# Patient Record
Sex: MALE | Race: WHITE | NOT HISPANIC OR LATINO | Employment: OTHER | URBAN - METROPOLITAN AREA
[De-identification: names, ages, dates, MRNs, and addresses within clinical notes are randomized per-mention and may not be internally consistent; named-entity substitution may affect disease eponyms.]

---

## 2017-01-18 ENCOUNTER — HOSPITAL ENCOUNTER (OUTPATIENT)
Dept: CARDIOLOGY | Facility: HOSPITAL | Age: 72
Setting detail: RECURRING SERIES
Discharge: HOME | End: 2017-01-18

## 2017-01-18 PROCEDURE — 36416 COLLJ CAPILLARY BLOOD SPEC: CPT | Performed by: INTERNAL MEDICINE

## 2017-01-18 PROCEDURE — 85610 PROTHROMBIN TIME: CPT | Performed by: INTERNAL MEDICINE

## 2017-02-24 ENCOUNTER — HOSPITAL ENCOUNTER (OUTPATIENT)
Dept: CARDIOLOGY | Facility: HOSPITAL | Age: 72
Setting detail: RECURRING SERIES
Discharge: HOME | End: 2017-02-24

## 2017-02-24 PROCEDURE — 85610 PROTHROMBIN TIME: CPT

## 2017-02-24 PROCEDURE — 36416 COLLJ CAPILLARY BLOOD SPEC: CPT

## 2017-03-28 ENCOUNTER — HOSPITAL ENCOUNTER (OUTPATIENT)
Dept: CARDIOLOGY | Facility: HOSPITAL | Age: 72
Setting detail: RECURRING SERIES
Discharge: HOME | End: 2017-03-28

## 2017-03-28 PROCEDURE — 85610 PROTHROMBIN TIME: CPT

## 2017-03-28 PROCEDURE — 36416 COLLJ CAPILLARY BLOOD SPEC: CPT

## 2017-04-01 DIAGNOSIS — I48.91 ATRIAL FIBRILLATION (HCC): ICD-10-CM

## 2017-04-03 RX ORDER — WARFARIN SODIUM 5 MG/1
TABLET ORAL
Qty: 90 TABLET | Refills: 2 | Status: SHIPPED | OUTPATIENT
Start: 2017-04-03 | End: 2017-04-06 | Stop reason: SDUPTHER

## 2017-04-05 ENCOUNTER — HOSPITAL ENCOUNTER (OUTPATIENT)
Dept: CARDIOLOGY | Facility: HOSPITAL | Age: 72
Setting detail: RECURRING SERIES
Discharge: HOME | End: 2017-04-05

## 2017-04-05 PROCEDURE — 36416 COLLJ CAPILLARY BLOOD SPEC: CPT

## 2017-04-05 PROCEDURE — 85610 PROTHROMBIN TIME: CPT

## 2017-04-06 RX ORDER — WARFARIN SODIUM 5 MG/1
5 TABLET ORAL
Qty: 60 TABLET | Refills: 0 | Status: SHIPPED | OUTPATIENT
Start: 2017-04-06 | End: 2018-01-22 | Stop reason: SDUPTHER

## 2017-04-19 ENCOUNTER — HOSPITAL ENCOUNTER (OUTPATIENT)
Dept: CARDIOLOGY | Facility: HOSPITAL | Age: 72
Setting detail: RECURRING SERIES
Discharge: HOME | End: 2017-04-19

## 2017-04-19 PROCEDURE — 36416 COLLJ CAPILLARY BLOOD SPEC: CPT

## 2017-04-19 PROCEDURE — 85610 PROTHROMBIN TIME: CPT

## 2017-05-19 ENCOUNTER — HOSPITAL ENCOUNTER (OUTPATIENT)
Dept: CARDIOLOGY | Facility: HOSPITAL | Age: 72
Setting detail: RECURRING SERIES
Discharge: HOME | End: 2017-05-19

## 2017-05-19 PROCEDURE — 36416 COLLJ CAPILLARY BLOOD SPEC: CPT

## 2017-05-19 PROCEDURE — 85610 PROTHROMBIN TIME: CPT

## 2017-06-14 ENCOUNTER — HOSPITAL ENCOUNTER (OUTPATIENT)
Dept: CARDIOLOGY | Facility: HOSPITAL | Age: 72
Setting detail: RECURRING SERIES
Discharge: HOME | End: 2017-06-14

## 2017-06-14 PROCEDURE — 85610 PROTHROMBIN TIME: CPT

## 2017-06-14 PROCEDURE — 36416 COLLJ CAPILLARY BLOOD SPEC: CPT

## 2017-07-21 ENCOUNTER — HOSPITAL ENCOUNTER (OUTPATIENT)
Dept: CARDIOLOGY | Facility: HOSPITAL | Age: 72
Setting detail: RECURRING SERIES
Discharge: HOME | End: 2017-07-21

## 2017-07-21 PROCEDURE — 85610 PROTHROMBIN TIME: CPT

## 2017-07-21 PROCEDURE — 36416 COLLJ CAPILLARY BLOOD SPEC: CPT

## 2017-08-23 ENCOUNTER — HOSPITAL ENCOUNTER (OUTPATIENT)
Dept: CARDIOLOGY | Facility: HOSPITAL | Age: 72
Setting detail: RECURRING SERIES
Discharge: HOME | End: 2017-08-23

## 2017-08-23 PROCEDURE — 85610 PROTHROMBIN TIME: CPT

## 2017-08-23 PROCEDURE — 36416 COLLJ CAPILLARY BLOOD SPEC: CPT

## 2017-08-28 RX ORDER — METOPROLOL SUCCINATE 25 MG/1
TABLET, EXTENDED RELEASE ORAL
Qty: 30 TABLET | Refills: 15 | Status: SHIPPED | OUTPATIENT
Start: 2017-08-28 | End: 2018-02-23 | Stop reason: SDUPTHER

## 2017-09-14 ENCOUNTER — HOSPITAL ENCOUNTER (OUTPATIENT)
Dept: CARDIOLOGY | Facility: HOSPITAL | Age: 72
Setting detail: RECURRING SERIES
Discharge: HOME | End: 2017-09-14

## 2017-09-14 PROCEDURE — 85610 PROTHROMBIN TIME: CPT

## 2017-09-14 PROCEDURE — 36416 COLLJ CAPILLARY BLOOD SPEC: CPT

## 2017-10-16 ENCOUNTER — HOSPITAL ENCOUNTER (OUTPATIENT)
Dept: CARDIOLOGY | Facility: HOSPITAL | Age: 72
Setting detail: RECURRING SERIES
Discharge: HOME | End: 2017-10-16

## 2017-10-16 PROCEDURE — 85610 PROTHROMBIN TIME: CPT

## 2017-10-16 PROCEDURE — 36416 COLLJ CAPILLARY BLOOD SPEC: CPT

## 2017-11-27 ENCOUNTER — HOSPITAL ENCOUNTER (OUTPATIENT)
Dept: CARDIOLOGY | Facility: HOSPITAL | Age: 72
Setting detail: RECURRING SERIES
Discharge: HOME | End: 2017-11-27

## 2017-11-27 PROCEDURE — 36416 COLLJ CAPILLARY BLOOD SPEC: CPT

## 2017-11-27 PROCEDURE — 85610 PROTHROMBIN TIME: CPT

## 2018-01-09 ENCOUNTER — TELEPHONE (OUTPATIENT)
Dept: CARDIOLOGY | Facility: HOSPITAL | Age: 73
End: 2018-01-09

## 2018-01-22 ENCOUNTER — HOSPITAL ENCOUNTER (OUTPATIENT)
Dept: CARDIOLOGY | Facility: HOSPITAL | Age: 73
Setting detail: RECURRING SERIES
Discharge: HOME | End: 2018-01-22

## 2018-01-22 PROCEDURE — 36416 COLLJ CAPILLARY BLOOD SPEC: CPT

## 2018-01-22 PROCEDURE — 85610 PROTHROMBIN TIME: CPT

## 2018-01-22 RX ORDER — WARFARIN SODIUM 5 MG/1
5 TABLET ORAL
Qty: 60 TABLET | Refills: 0 | Status: SHIPPED | OUTPATIENT
Start: 2018-01-22 | End: 2018-04-06 | Stop reason: SDUPTHER

## 2018-02-21 ENCOUNTER — HOSPITAL ENCOUNTER (OUTPATIENT)
Dept: CARDIOLOGY | Facility: HOSPITAL | Age: 73
Setting detail: RECURRING SERIES
Discharge: HOME | End: 2018-02-21

## 2018-02-21 PROCEDURE — 85610 PROTHROMBIN TIME: CPT

## 2018-02-21 PROCEDURE — 36416 COLLJ CAPILLARY BLOOD SPEC: CPT

## 2018-02-23 ENCOUNTER — OFFICE VISIT (OUTPATIENT)
Dept: CARDIOLOGY | Facility: CLINIC | Age: 73
End: 2018-02-23

## 2018-02-23 VITALS
WEIGHT: 224 LBS | BODY MASS INDEX: 32.07 KG/M2 | HEIGHT: 70 IN | SYSTOLIC BLOOD PRESSURE: 160 MMHG | HEART RATE: 46 BPM | DIASTOLIC BLOOD PRESSURE: 82 MMHG

## 2018-02-23 DIAGNOSIS — G47.33 OSA ON CPAP: ICD-10-CM

## 2018-02-23 DIAGNOSIS — I48.20 CHRONIC ATRIAL FIBRILLATION (HCC): Primary | ICD-10-CM

## 2018-02-23 DIAGNOSIS — Z99.89 OSA ON CPAP: ICD-10-CM

## 2018-02-23 DIAGNOSIS — E66.09 NON MORBID OBESITY DUE TO EXCESS CALORIES: ICD-10-CM

## 2018-02-23 DIAGNOSIS — I10 ESSENTIAL HYPERTENSION: ICD-10-CM

## 2018-02-23 PROCEDURE — 93000 ELECTROCARDIOGRAM COMPLETE: CPT | Performed by: NURSE PRACTITIONER

## 2018-02-23 PROCEDURE — 99213 OFFICE O/P EST LOW 20 MIN: CPT | Performed by: NURSE PRACTITIONER

## 2018-02-23 RX ORDER — AMLODIPINE BESYLATE 5 MG/1
TABLET ORAL
COMMUNITY
Start: 2017-11-24 | End: 2018-03-02 | Stop reason: SDUPTHER

## 2018-02-23 RX ORDER — METOPROLOL SUCCINATE 25 MG/1
25 TABLET, EXTENDED RELEASE ORAL DAILY
COMMUNITY
Start: 2015-04-22 | End: 2020-11-18

## 2018-02-23 RX ORDER — OMEPRAZOLE 20 MG/1
20 CAPSULE, DELAYED RELEASE ORAL DAILY
COMMUNITY

## 2018-02-23 NOTE — PROGRESS NOTES
Date of Office Visit: 2018  Encounter Provider: TJ Carranza  Place of Service: [facility] CARDIOLOGY  Patient Name: Diego Martinez  :1945    Chief Complaint   Patient presents with   • Atrial Fibrillation   :     HPI: Diego Martinez is a 72 y.o. male who is a patient of Dr. Conner's with a history of permanent AF, HTN, PITO and obesity. He was last seen by Dr. Conner in 2016. He presents today for routine follow up.    He reports that he feels good. He has had a good year. He denies chest pain, shortness of breath, dizziness or syncope. He does have some mild intermittent lower extremity swelling.           Past Medical History:   Diagnosis Date   • Atrial fibrillation    • Colon cancer        History reviewed. No pertinent surgical history.    Social History     Social History   • Marital status: Single     Spouse name: N/A   • Number of children: N/A   • Years of education: N/A     Occupational History   • Not on file.     Social History Main Topics   • Smoking status: Former Smoker   • Smokeless tobacco: Not on file   • Alcohol use No   • Drug use: No   • Sexual activity: Not on file     Other Topics Concern   • Not on file     Social History Narrative       History reviewed. No pertinent family history.    Review of Systems   Constitution: Negative for chills, fever, malaise/fatigue, weight gain and weight loss.   HENT: Negative for ear pain, hearing loss, nosebleeds and sore throat.    Eyes: Negative for double vision, pain and visual disturbance.   Cardiovascular: Negative for chest pain, dyspnea on exertion, irregular heartbeat, leg swelling, near-syncope, orthopnea, palpitations, paroxysmal nocturnal dyspnea and syncope.   Respiratory: Negative for cough, shortness of breath, sleep disturbances due to breathing, snoring and wheezing.    Endocrine: Negative for cold intolerance, heat intolerance and polyuria.   Hematologic/Lymphatic:  "Negative.    Skin: Negative for itching and rash.   Musculoskeletal: Negative for joint pain, joint swelling and myalgias.   Gastrointestinal: Negative for abdominal pain, diarrhea, melena, nausea and vomiting.   Genitourinary: Negative for frequency, hematuria and hesitancy.   Neurological: Negative for excessive daytime sleepiness, headaches, light-headedness, numbness, paresthesias and seizures.   Psychiatric/Behavioral: Negative for altered mental status and depression.   Allergic/Immunologic: Negative.    All other systems reviewed and are negative.      No Known Allergies      Current Outpatient Prescriptions:   •  amLODIPine (NORVASC) 5 MG tablet, TAKE 1 TABLET BY MOUTH ONE TIME A DAY , Disp: , Rfl:   •  metoprolol succinate XL (TOPROL-XL) 25 MG 24 hr tablet, Take 25 mg by mouth Daily., Disp: , Rfl:   •  omeprazole (priLOSEC) 20 MG capsule, Take 20 mg by mouth Daily., Disp: , Rfl:   •  warfarin (JANTOVEN) 5 MG tablet, Take 1 tablet by mouth Daily., Disp: 60 tablet, Rfl: 0     Objective:     Vitals:    02/23/18 0924   BP: 160/82   Pulse: (!) 46   Weight: 102 kg (224 lb)   Height: 177.8 cm (70\")     Body mass index is 32.14 kg/(m^2).    PHYSICAL EXAM:    Vitals Reviewed.   General Appearance: No acute distress, well developed and well nourished.   Eyes: Conjunctiva and lids: No erythema, swelling, or discharge. Sclera non-icteric.   HENT: Atraumatic, normocephalic. External eyes, ears, and nose normal.   Respiratory: No signs of respiratory distress. Respiration rhythm and depth normal.   Clear to auscultation. No rales, crackles, rhonchi, or wheezing auscultated.   Cardiovascular:  Jugular Venous Pressure: Normal  Heart Rate and Rhythm: Irregulaly, irregular.  Heart Sounds: Normal S1 and S2. No S3 or S4 noted.  Murmurs: No murmurs noted. No rubs, thrills, or gallops.   Arterial Pulses:  Posterior tibialis and dorsalis pedis pulses normal.   Lower Extremities: Trace to 1+ bilateral pre-tibial edema. "   Gastrointestinal:  Abdomen soft, non-distended, non-tender.   Musculoskeletal: Normal movement of extremities  Skin and Nails: General appearance normal. No pallor, cyanosis, diaphoresis. Skin temperature normal. No clubbing of fingernails.   Psychiatric: Patient alert and oriented to person, place, and time. Speech and behavior appropriate. Normal mood and affect.       ECG 12 Lead  Date/Time: 2/23/2018 9:43 AM  Performed by: DESMOND NASSAR  Authorized by: DESMOND NASSAR   Comparison: compared with previous ECG from 7/22/2016  Similar to previous ECG  Rhythm: atrial fibrillation  BPM: 49  Clinical impression: abnormal ECG              Assessment:       Diagnosis Plan   1. Chronic atrial fibrillation     2. Non morbid obesity due to excess calories     3. PITO on CPAP     4. Essential hypertension            Plan:       1. Atrial Fibrillation and Atrial Flutter  Assessment  • The patient has permanent atrial fibrillation  • The patient's CHADS2-VASc score is 2  • A LBU2SA0-RNUy score of 2 or more is considered a high risk for a thromboembolic event  • Warfarin prescribed  • Permanent AF, asymptomatic. He feels good. HR 40's, but feels good. No fatigue or dizziness.     2. For the problem of overweight/obesity, we discussed the importance of lifestyle measures and strategies for weight loss, such as improved nutrition, regular exercise and sleep hygiene.      3. PITO-he was using CPAP but says he has not been using it as much recently. Encourage to resume use of CPAP.    4. HTN, elevated in office today but he says it has been fine at home. Defer to PCP.    Return to see Dr. Conner in 18 months.     As always, it has been a pleasure to participate in your patient's care.      Sincerely,         TJ Anderson

## 2018-03-02 ENCOUNTER — TELEPHONE (OUTPATIENT)
Dept: CARDIOLOGY | Facility: CLINIC | Age: 73
End: 2018-03-02

## 2018-03-02 RX ORDER — AMLODIPINE BESYLATE 5 MG/1
5 TABLET ORAL DAILY
Qty: 30 TABLET | Refills: 5 | Status: SHIPPED | OUTPATIENT
Start: 2018-03-02 | End: 2018-08-27 | Stop reason: SDUPTHER

## 2018-03-02 NOTE — TELEPHONE ENCOUNTER
Rosemarie    Mr Mike calls because he seen his PCP and they will no longer fill his Rx for his amlodipine and wants to know if we can do this for him.    He can be reached at [phone].      Thank you  Paloma PUGH

## 2018-03-02 NOTE — TELEPHONE ENCOUNTER
Divina,    Will you call him and find out why his PCP would not fill his amlodipine? It is fine to refill but this is something usually managed by PCP's.    Rosemarie

## 2018-03-26 ENCOUNTER — HOSPITAL ENCOUNTER (OUTPATIENT)
Dept: CARDIOLOGY | Facility: HOSPITAL | Age: 73
Setting detail: RECURRING SERIES
Discharge: HOME | End: 2018-03-26

## 2018-03-26 PROCEDURE — 85610 PROTHROMBIN TIME: CPT

## 2018-03-26 PROCEDURE — 36416 COLLJ CAPILLARY BLOOD SPEC: CPT

## 2018-04-06 RX ORDER — WARFARIN SODIUM 5 MG/1
TABLET ORAL
Qty: 60 TABLET | Refills: 0 | Status: SHIPPED | OUTPATIENT
Start: 2018-04-06 | End: 2018-06-08 | Stop reason: SDUPTHER

## 2018-04-30 ENCOUNTER — HOSPITAL ENCOUNTER (OUTPATIENT)
Dept: CARDIOLOGY | Facility: HOSPITAL | Age: 73
Setting detail: RECURRING SERIES
Discharge: HOME | End: 2018-04-30

## 2018-04-30 PROCEDURE — 36416 COLLJ CAPILLARY BLOOD SPEC: CPT

## 2018-04-30 PROCEDURE — 85610 PROTHROMBIN TIME: CPT

## 2018-06-08 RX ORDER — WARFARIN SODIUM 5 MG/1
TABLET ORAL
Qty: 60 TABLET | Refills: 2 | Status: SHIPPED | OUTPATIENT
Start: 2018-06-08 | End: 2018-10-30 | Stop reason: SDUPTHER

## 2018-06-20 ENCOUNTER — HOSPITAL ENCOUNTER (OUTPATIENT)
Dept: CARDIOLOGY | Facility: HOSPITAL | Age: 73
Setting detail: RECURRING SERIES
Discharge: HOME | End: 2018-06-20

## 2018-06-20 PROCEDURE — 85610 PROTHROMBIN TIME: CPT

## 2018-06-20 PROCEDURE — 36416 COLLJ CAPILLARY BLOOD SPEC: CPT

## 2018-07-30 ENCOUNTER — HOSPITAL ENCOUNTER (OUTPATIENT)
Dept: CARDIOLOGY | Facility: HOSPITAL | Age: 73
Setting detail: RECURRING SERIES
Discharge: HOME | End: 2018-07-30

## 2018-07-30 PROCEDURE — 36416 COLLJ CAPILLARY BLOOD SPEC: CPT

## 2018-07-30 PROCEDURE — 85610 PROTHROMBIN TIME: CPT

## 2018-08-13 ENCOUNTER — ANTICOAGULATION VISIT (OUTPATIENT)
Dept: PHARMACY | Facility: HOSPITAL | Age: 73
End: 2018-08-13

## 2018-08-13 DIAGNOSIS — I48.20 CHRONIC ATRIAL FIBRILLATION (HCC): ICD-10-CM

## 2018-08-13 LAB
INR PPP: 3 (ref 0.91–1.09)
PROTHROMBIN TIME: 35.5 SECONDS (ref 10–13.8)

## 2018-08-13 PROCEDURE — G0463 HOSPITAL OUTPT CLINIC VISIT: HCPCS | Performed by: PHARMACIST

## 2018-08-13 PROCEDURE — 85610 PROTHROMBIN TIME: CPT

## 2018-08-13 PROCEDURE — 36416 COLLJ CAPILLARY BLOOD SPEC: CPT

## 2018-08-13 NOTE — PROGRESS NOTES
Anticoagulation Clinic Progress Note  Anticoagulation Summary  As of 2018    INR goal:   2.0-3.0   TTR:   --   Today's INR:   3.0   Warfarin maintenance plan:   7.5 mg on Fri; 5 mg all other days   Weekly warfarin total:   37.5 mg   Plan last modified:   Thelma Gonzalez RPH (2018)   Next INR check:   2018   Target end date:   Indefinite    Indications    Chronic atrial fibrillation (CMS/HCC) [I48.2]             Anticoagulation Episode Summary     INR check location:       Preferred lab:       [facility] [place]    Comments:         Anticoagulation Care Providers     Provider Role Specialty Phone number    Chuck Conner MD Referring Cardiology [phone]    Thelma Gonzalez RPH Responsible Pharmacy         Drug interactions: No changes in medications  Diet: Consistent    Clinic Interview:  Patient Findings     Negatives:   Signs/symptoms of thrombosis, Signs/symptoms of bleeding,   Laboratory test error suspected, Change in health, Change in alcohol use,   Change in activity, Upcoming invasive procedure, Emergency department   visit, Upcoming dental procedure, Missed doses, Extra doses, Change in   medications, Change in diet/appetite, Hospital admission, Bruising, Other   complaints      Clinical Outcomes     Negatives:   Major bleeding event, Thromboembolic event,   Anticoagulation-related hospital admission, Anticoagulation-related ED   visit, Anticoagulation-related fatality        Education:  Diego Martinez is a new start in the [facility]. We discussed the followin) Warfarin's indication, mechanism, and dosing  2) Enforced the importance of taking warfarin as instructed and at the same time every day, preferably in the evening so that we can make dose adjustments more easily following subsequent clinic visits  3) What he should do about a missed dose; pts can take missed doses within about 12 hours of their usual  scheduled dose, but he was instructed on the importance of not doubling up on doses unless told to do so by the [facility]  4) Explained possible side effects of warfarin therapy, including increased risk of bleeding, s/sx of bleeding and s/sx of any additional clots/PE/CVA.   5) Discussed monitoring of warfarin, the INR, goal INR range, and the frequency of monitoring  6) Reviewed drug/food/tobacco/EtOH interactions and provided written information covering these topics in more detail, explaining that green, leafy vegetables interact most heavily with warfarin  7) Instructed the pt not to take or discontinue any medications without informing his physician/pharmacist and reminded him to inform us of any dietary changes, as well  8) Explained that he would be coming into the clinic more frequently in these first few weeks of therapy as we try to adjust his dose and achieve a therapeutic INR x 2 consecutive readings. Once that is achieved, patient will follow up in clinic every 4 weeks, on average.    He stated no problems with transportation or scheduling clinic appts in this manner. he expressed understanding of the information provided and has no additional questions at this time.    Diego Martinez was presented with a copy of the Patients Rights and Responsibilities. he expressed verbal consent and agreement to receive care in the [facility] under the current collaborative care agreement with [facility].       INR History:  Previous INR data from [facility] is recorded in [facility] [employer] and scanned into the patient's chart in [email or web address]. These results have been analyzed and reviewed.    Plan:  1. INR is Therapeutic today- see above in Anticoagulation Summary.   Will instruct Diego Martinez to Continue their warfarin regimen- see above in Anticoagulation Summary.  2. Follow up in 2 weeks  3. Patient declines warfarin refills.  4. Verbal and  written information provided. Patient expresses understanding and has no further questions at this time.    Thelma Gonzalez, [facility]

## 2018-08-27 ENCOUNTER — ANTICOAGULATION VISIT (OUTPATIENT)
Dept: PHARMACY | Facility: HOSPITAL | Age: 73
End: 2018-08-27

## 2018-08-27 DIAGNOSIS — I48.20 CHRONIC ATRIAL FIBRILLATION (HCC): ICD-10-CM

## 2018-08-27 LAB
INR PPP: 3 (ref 0.91–1.09)
PROTHROMBIN TIME: 35.7 SECONDS (ref 10–13.8)

## 2018-08-27 PROCEDURE — 85610 PROTHROMBIN TIME: CPT

## 2018-08-27 PROCEDURE — G0463 HOSPITAL OUTPT CLINIC VISIT: HCPCS

## 2018-08-27 PROCEDURE — 36416 COLLJ CAPILLARY BLOOD SPEC: CPT

## 2018-08-27 NOTE — PROGRESS NOTES
Anticoagulation Clinic Progress Note    Anticoagulation Summary  As of 8/27/2018    INR goal:   2.0-3.0   TTR:   100.0 % (4 d)   Today's INR:   3.0   Warfarin maintenance plan:   7.5 mg on Fri; 5 mg all other days   Weekly warfarin total:   37.5 mg   No change documented:   France Persaud RPH   Plan last modified:   Thelma Gonzalez RPH (8/13/2018)   Next INR check:   9/24/2018   Target end date:   Indefinite    Indications    Chronic atrial fibrillation (CMS/HCC) [I48.2]             Anticoagulation Episode Summary     INR check location:       Preferred lab:       Send INR reminders to:    [facility] [place]    Comments:         Anticoagulation Care Providers     Provider Role Specialty Phone number    Chuck Conner MD Referring Cardiology [phone]    Thelma Gonzalez RPH Responsible Pharmacy           Drug interactions: has remained unchanged.  Diet: has remained unchanged.    Clinic Interview:  Patient Findings     Negatives:   Signs/symptoms of thrombosis, Signs/symptoms of bleeding,   Laboratory test error suspected, Change in health, Change in alcohol use,   Change in activity, Upcoming invasive procedure, Emergency department   visit, Upcoming dental procedure, Missed doses, Extra doses, Change in   medications, Change in diet/appetite, Hospital admission, Bruising, Other   complaints      Clinical Outcomes     Negatives:   Major bleeding event, Thromboembolic event,   Anticoagulation-related hospital admission, Anticoagulation-related ED   visit, Anticoagulation-related fatality        INR History:  Anticoagulation Monitoring 8/13/2018 8/27/2018   INR 3.0 3.0   INR Date 8/13/2018 8/27/2018   INR Goal 2.0-3.0 2.0-3.0   Trend - Same   Last Week Total 0 mg 37.5 mg   Next Week Total 37.5 mg 37.5 mg   Sun 5 mg 5 mg   Mon 5 mg 5 mg   Tue 5 mg 5 mg   Wed 5 mg 5 mg   Thu 5 mg 5 mg   Fri 7.5 mg 7.5 mg   Sat 5 mg 5 mg   Visit Report - -       Previous INR data from [facility] is recorded in  [facility] and scanned into the patient's chart in [facility]. These results have been analyzed and reviewed.      Plan:  1. INR is Therapeutic today- see above in Anticoagulation Summary.  Will instruct Diego Martinez to Continue their warfarin regimen- see above in Anticoagulation Summary.  2. Follow up in 1 month  3. Patient declines warfarin refills.  4. Verbal and written information provided. Patient expresses understanding and has no further questions at this time.    France Persaud RP

## 2018-08-28 RX ORDER — AMLODIPINE BESYLATE 5 MG/1
TABLET ORAL
Qty: 30 TABLET | Refills: 11 | Status: SHIPPED | OUTPATIENT
Start: 2018-08-28 | End: 2019-08-31 | Stop reason: SDUPTHER

## 2018-09-04 RX ORDER — METOPROLOL SUCCINATE 25 MG/1
TABLET, EXTENDED RELEASE ORAL
Qty: 30 TABLET | Refills: 14 | Status: SHIPPED | OUTPATIENT
Start: 2018-09-04 | End: 2019-09-29 | Stop reason: SDUPTHER

## 2018-09-26 ENCOUNTER — ANTICOAGULATION VISIT (OUTPATIENT)
Dept: PHARMACY | Facility: HOSPITAL | Age: 73
End: 2018-09-26

## 2018-09-26 DIAGNOSIS — I48.20 CHRONIC ATRIAL FIBRILLATION (HCC): ICD-10-CM

## 2018-09-26 LAB
INR PPP: 2.6 (ref 0.91–1.09)
PROTHROMBIN TIME: 31.6 SECONDS (ref 10–13.8)

## 2018-09-26 PROCEDURE — 36416 COLLJ CAPILLARY BLOOD SPEC: CPT

## 2018-09-26 PROCEDURE — 85610 PROTHROMBIN TIME: CPT

## 2018-09-26 NOTE — PROGRESS NOTES
Anticoagulation Clinic Progress Note    Anticoagulation Summary  As of 9/26/2018    INR goal:   2.0-3.0   TTR:   100.0 % (1.1 mo)   Today's INR:   2.6   Warfarin maintenance plan:   7.5 mg on Fri; 5 mg all other days   Weekly warfarin total:   37.5 mg   No change documented:   Joana Hirsch   Plan last modified:   Thelma Gonzalez RPH (8/13/2018)   Next INR check:   10/24/2018   Target end date:   Indefinite    Indications    Chronic atrial fibrillation (CMS/HCC) [I48.2]             Anticoagulation Episode Summary     INR check location:       Preferred lab:       Send INR reminders to:    [facility]    Comments:         Anticoagulation Care Providers     Provider Role Specialty Phone number    Chuck Conner MD Referring Cardiology [phone]    Thelma Gonzalez RPH Responsible Pharmacy           Drug interactions: has remained unchanged.  Diet: has remained unchanged.    Clinic Interview:  Patient Findings     Negatives:   Signs/symptoms of thrombosis, Signs/symptoms of bleeding,   Laboratory test error suspected, Change in health, Change in alcohol use,   Change in activity, Upcoming invasive procedure, Emergency department   visit, Upcoming dental procedure, Missed doses, Extra doses, Change in   medications, Change in diet/appetite, Hospital admission, Bruising, Other   complaints      Clinical Outcomes     Negatives:   Major bleeding event, Thromboembolic event,   Anticoagulation-related hospital admission, Anticoagulation-related ED   visit, Anticoagulation-related fatality        INR History:  Anticoagulation Monitoring 8/13/2018 8/27/2018 9/26/2018   INR 3.0 3.0 2.6   INR Date 8/13/2018 8/27/2018 9/26/2018   INR Goal 2.0-3.0 2.0-3.0 2.0-3.0   Trend - Same Same   Last Week Total 0 mg 37.5 mg 37.5 mg   Next Week Total 37.5 mg 37.5 mg 37.5 mg   Sun 5 mg 5 mg 5 mg   Mon 5 mg 5 mg 5 mg   Tue 5 mg 5 mg 5 mg   Wed 5 mg 5 mg 5 mg   Thu 5 mg 5 mg 5 mg   Fri 7.5 mg 7.5 mg 7.5 mg   Sat 5 mg 5  mg 5 mg   Visit Report - - -   Some recent data might be hidden       Plan:  1. INR is therapeutic today- see above in Anticoagulation Summary.   Will instruct Diego Martinez to continue their warfarin regimen- see above in Anticoagulation Summary.  2. Follow up in 4 weeks.  3. Patient declines warfarin refills.  4. Verbal and written information provided. Patient expresses understanding and has no further questions at this time.    Joana Hirsch

## 2018-10-29 ENCOUNTER — TELEPHONE (OUTPATIENT)
Dept: PHARMACY | Facility: HOSPITAL | Age: 73
End: 2018-10-29

## 2018-10-30 RX ORDER — WARFARIN SODIUM 5 MG/1
TABLET ORAL
Qty: 30 TABLET | Refills: 1 | Status: SHIPPED | OUTPATIENT
Start: 2018-10-30 | End: 2018-11-01 | Stop reason: SDUPTHER

## 2018-11-01 ENCOUNTER — ANTICOAGULATION VISIT (OUTPATIENT)
Dept: PHARMACY | Facility: HOSPITAL | Age: 73
End: 2018-11-01

## 2018-11-01 DIAGNOSIS — I48.20 CHRONIC ATRIAL FIBRILLATION (HCC): ICD-10-CM

## 2018-11-01 LAB
INR PPP: 2.9 (ref 0.91–1.09)
PROTHROMBIN TIME: 34.4 SECONDS (ref 10–13.8)

## 2018-11-01 PROCEDURE — 36416 COLLJ CAPILLARY BLOOD SPEC: CPT

## 2018-11-01 PROCEDURE — 85610 PROTHROMBIN TIME: CPT

## 2018-11-01 RX ORDER — WARFARIN SODIUM 5 MG/1
TABLET ORAL
Qty: 30 TABLET | Refills: 1 | Status: SHIPPED | OUTPATIENT
Start: 2018-11-01 | End: 2019-02-08 | Stop reason: SDUPTHER

## 2018-11-01 NOTE — PROGRESS NOTES
Anticoagulation Clinic Progress Note    Anticoagulation Summary  As of 11/1/2018    INR goal:   2.0-3.0   TTR:   100.0 % (2.3 mo)   Today's INR:   2.9   Warfarin maintenance plan:   7.5 mg on Fri; 5 mg all other days   Weekly warfarin total:   37.5 mg   No change documented:   Tabitha Courtney   Plan last modified:   Thelma Gonzalez RPH (8/13/2018)   Next INR check:   11/29/2018   Priority:   Maintenance   Target end date:   Indefinite    Indications    Chronic atrial fibrillation (CMS/HCC) [I48.2]             Anticoagulation Episode Summary     INR check location:       Preferred lab:       [facility] [place]    Comments:         Anticoagulation Care Providers     Provider Role Specialty Phone number    Chuck Conner MD Referring Cardiology [phone]    Thelma Gonzalez RPH Responsible Pharmacy           Drug interactions: has remained unchanged.  Diet: has remained unchanged.    Clinic Interview:  Patient Findings     Negatives:   Signs/symptoms of thrombosis, Signs/symptoms of bleeding,   Laboratory test error suspected, Change in health, Change in alcohol use,   Change in activity, Upcoming invasive procedure, Emergency department   visit, Upcoming dental procedure, Missed doses, Extra doses, Change in   medications, Change in diet/appetite, Hospital admission, Bruising, Other   complaints      Clinical Outcomes     Negatives:   Major bleeding event, Thromboembolic event,   Anticoagulation-related hospital admission, Anticoagulation-related ED   visit, Anticoagulation-related fatality        INR History:  Anticoagulation Monitoring 8/27/2018 9/26/2018 11/1/2018   INR 3.0 2.6 2.9   INR Date 8/27/2018 9/26/2018 11/1/2018   INR Goal 2.0-3.0 2.0-3.0 2.0-3.0   Trend Same Same Same   Last Week Total 37.5 mg 37.5 mg 37.5 mg   Next Week Total 37.5 mg 37.5 mg 37.5 mg   Sun 5 mg 5 mg 5 mg   Mon 5 mg 5 mg 5 mg   Tue 5 mg 5 mg 5 mg   Wed 5 mg 5 mg 5 mg   Thu 5 mg 5 mg 5 mg   Fri  7.5 mg 7.5 mg 7.5 mg   Sat 5 mg 5 mg 5 mg   Visit Report - - -   Some recent data might be hidden       Plan:  1. INR is therapeutic today- see above in Anticoagulation Summary.   Will instruct Diego Martinez to continue their warfarin regimen- see above in Anticoagulation Summary.  2. Follow up in 4 weeks.  3. Patient declines warfarin refills.  4. Verbal and written information provided. Patient expresses understanding and has no further questions at this time.    Tabitha Courtney

## 2018-11-29 ENCOUNTER — ANTICOAGULATION VISIT (OUTPATIENT)
Dept: PHARMACY | Facility: HOSPITAL | Age: 73
End: 2018-11-29

## 2018-11-29 DIAGNOSIS — I48.20 CHRONIC ATRIAL FIBRILLATION (HCC): ICD-10-CM

## 2018-11-29 LAB
INR PPP: 2.7 (ref 0.91–1.09)
PROTHROMBIN TIME: 32.4 SECONDS (ref 10–13.8)

## 2018-11-29 PROCEDURE — 85610 PROTHROMBIN TIME: CPT

## 2018-11-29 PROCEDURE — 36416 COLLJ CAPILLARY BLOOD SPEC: CPT

## 2018-11-29 NOTE — PROGRESS NOTES
Anticoagulation Clinic Progress Note    Anticoagulation Summary  As of 2018    INR goal:   2.0-3.0   TTR:   100.0 % (3.3 mo)   INR used for dosin.7 (2018)   Warfarin maintenance plan:   7.5 mg on Fri; 5 mg all other days   Weekly warfarin total:   37.5 mg   No change documented:   Lilly Burger   Plan last modified:   Thelma Gonzalez RPH (2018)   Next INR check:   2018   Priority:   Maintenance   Target end date:   Indefinite    Indications    Chronic atrial fibrillation (CMS/[facility]) [I48.2]             Anticoagulation Episode Summary     INR check location:       Preferred lab:       Send INR reminders to:    [facility]    Comments:         Anticoagulation Care Providers     Provider Role Specialty Phone number    Chuck Conner MD Referring Cardiology [phone]    Thelma Gonzalez RPH Responsible Pharmacy [phone]          Drug interactions: has remained unchanged.  Diet: has remained unchanged.    Clinic Interview:  Patient Findings     Negatives:   Signs/symptoms of thrombosis, Signs/symptoms of bleeding,   Laboratory test error suspected, Change in health, Change in alcohol use,   Change in activity, Upcoming invasive procedure, Emergency department   visit, Upcoming dental procedure, Missed doses, Extra doses, Change in   medications, Change in diet/appetite, Hospital admission, Bruising, Other   complaints      Clinical Outcomes     Negatives:   Major bleeding event, Thromboembolic event,   Anticoagulation-related hospital admission, Anticoagulation-related ED   visit, Anticoagulation-related fatality        INR History:  Anticoagulation Monitoring 2018   INR 2.6 2.9 2.7   INR Date 2018   INR Goal 2.0-3.0 2.0-3.0 2.0-3.0   Trend Same Same Same   Last Week Total 37.5 mg 37.5 mg 37.5 mg   Next Week Total 37.5 mg 37.5 mg 37.5 mg   Sun 5 mg 5 mg 5 mg   Mon 5 mg 5 mg 5 mg   Tue 5 mg 5 mg 5 mg   Wed  5 mg 5 mg 5 mg   Thu 5 mg 5 mg 5 mg   Fri 7.5 mg 7.5 mg 7.5 mg   Sat 5 mg 5 mg 5 mg   Visit Report - - -   Some recent data might be hidden       Plan:  1. INR is therapeutic today- see above in Anticoagulation Summary.   Will instruct Diego Martinez to continue their warfarin regimen- see above in Anticoagulation Summary.  2. Follow up in 4 weeks.  3. Patient declines warfarin refills.  4. Verbal and written information provided. Patient expresses understanding and has no further questions at this time.    Lilly Burger

## 2018-12-28 ENCOUNTER — ANTICOAGULATION VISIT (OUTPATIENT)
Dept: PHARMACY | Facility: HOSPITAL | Age: 73
End: 2018-12-28

## 2018-12-28 DIAGNOSIS — I48.20 CHRONIC ATRIAL FIBRILLATION (HCC): ICD-10-CM

## 2018-12-28 LAB
INR PPP: 2.1 (ref 0.91–1.09)
PROTHROMBIN TIME: 24.8 SECONDS (ref 10–13.8)

## 2018-12-28 PROCEDURE — 85610 PROTHROMBIN TIME: CPT

## 2018-12-28 PROCEDURE — 36416 COLLJ CAPILLARY BLOOD SPEC: CPT

## 2018-12-28 NOTE — PROGRESS NOTES
Anticoagulation Clinic Progress Note    Anticoagulation Summary  As of 2018    INR goal:   2.0-3.0   TTR:   100.0 % (4.2 mo)   INR used for dosin.1 (2018)   Warfarin maintenance plan:   7.5 mg on Fri; 5 mg all other days   Weekly warfarin total:   37.5 mg   No change documented:   Devin Phillips RPH   Plan last modified:   Thelma Gonzalez RPH (2018)   Next INR check:   2019   Priority:   Maintenance   Target end date:   Indefinite    Indications    Chronic atrial fibrillation (CMS/HCC) [I48.2]             Anticoagulation Episode Summary     INR check location:       Preferred lab:       Send INR reminders to:   [facility]    Comments:         Anticoagulation Care Providers     Provider Role Specialty Phone number    Chuck Conner MD Referring Cardiology [phone]          Clinic Interview:  Patient Findings     Negatives:   Signs/symptoms of thrombosis, Signs/symptoms of bleeding,   Laboratory test error suspected, Change in health, Change in alcohol use,   Change in activity, Upcoming invasive procedure, Emergency department   visit, Upcoming dental procedure, Missed doses, Extra doses, Change in   medications, Change in diet/appetite, Hospital admission, Bruising, Other   complaints      Clinical Outcomes     Negatives:   Major bleeding event, Thromboembolic event,   Anticoagulation-related hospital admission, Anticoagulation-related ED   visit, Anticoagulation-related fatality        INR History:  Anticoagulation Monitoring 2018   INR 2.9 2.7 2.1   INR Date 2018   INR Goal 2.0-3.0 2.0-3.0 2.0-3.0   Trend Same Same Same   Last Week Total 37.5 mg 37.5 mg 37.5 mg   Next Week Total 37.5 mg 37.5 mg 37.5 mg   Sun 5 mg 5 mg 5 mg   Mon 5 mg 5 mg 5 mg   Tue 5 mg 5 mg 5 mg   Wed 5 mg 5 mg 5 mg   Thu 5 mg 5 mg 5 mg   Fri 7.5 mg 7.5 mg 7.5 mg   Sat 5 mg 5 mg 5 mg   Visit Report - - -   Some recent data might be  hidden       Plan:  1. INR is Therapeutic today- see above in Anticoagulation Summary.  Will instruct Diego Martinez to Continue their warfarin regimen- see above in Anticoagulation Summary.  2. Follow up in 1 month  3. Patient declines warfarin refills.  4. Verbal and written information provided. Patient expresses understanding and has no further questions at this time.    Devin Phillips [facility]

## 2019-02-08 ENCOUNTER — ANTICOAGULATION VISIT (OUTPATIENT)
Dept: PHARMACY | Facility: HOSPITAL | Age: 74
End: 2019-02-08

## 2019-02-08 DIAGNOSIS — I48.20 CHRONIC ATRIAL FIBRILLATION (HCC): ICD-10-CM

## 2019-02-08 LAB
INR PPP: 2.1 (ref 0.91–1.09)
PROTHROMBIN TIME: 25.6 SECONDS (ref 10–13.8)

## 2019-02-08 PROCEDURE — 36416 COLLJ CAPILLARY BLOOD SPEC: CPT

## 2019-02-08 PROCEDURE — 85610 PROTHROMBIN TIME: CPT

## 2019-02-08 RX ORDER — WARFARIN SODIUM 5 MG/1
TABLET ORAL
Qty: 105 TABLET | Refills: 1 | Status: SHIPPED | OUTPATIENT
Start: 2019-02-08 | End: 2019-07-29 | Stop reason: SDUPTHER

## 2019-02-08 NOTE — PROGRESS NOTES
Anticoagulation Clinic Progress Note    Anticoagulation Summary  As of 2/8/2019    INR goal:   2.0-3.0   TTR:   100.0 % (5.6 mo)   INR used for dosing:      Warfarin maintenance plan:   7.5 mg on Fri; 5 mg all other days   Weekly warfarin total:   37.5 mg   No change documented:   Tabitha Courtney   Plan last modified:   Thelma Gonzalez [facility] (8/13/2018)   Next INR check:   3/8/2019   Priority:   Maintenance   Target end date:   Indefinite    Indications    Chronic atrial fibrillation (CMS/HCC) [I48.2]             Anticoagulation Episode Summary     INR check location:       Preferred lab:       Send INR reminders to:    [facility]    Comments:         Anticoagulation Care Providers     Provider Role Specialty Phone number    Chuck Conner MD Referring Cardiology [phone]          Clinic Interview:  Patient Findings     Negatives:   Signs/symptoms of thrombosis, Signs/symptoms of bleeding,   Laboratory test error suspected, Change in health, Change in alcohol use,   Change in activity, Upcoming invasive procedure, Emergency department   visit, Upcoming dental procedure, Missed doses, Extra doses, Change in   medications, Change in diet/appetite, Hospital admission, Bruising, Other   complaints      Clinical Outcomes     Negatives:   Major bleeding event, Thromboembolic event,   Anticoagulation-related hospital admission, Anticoagulation-related ED   visit, Anticoagulation-related fatality        INR History:  Anticoagulation Monitoring 11/29/2018 12/28/2018 2/8/2019   INR 2.7 2.1 -   INR Date 11/29/2018 12/28/2018 -   INR Goal 2.0-3.0 2.0-3.0 2.0-3.0   Trend Same Same Same   Last Week Total 37.5 mg 37.5 mg 37.5 mg   Next Week Total 37.5 mg 37.5 mg 37.5 mg   Sun 5 mg 5 mg 5 mg   Mon 5 mg 5 mg 5 mg   Tue 5 mg 5 mg 5 mg   Wed 5 mg 5 mg 5 mg   Thu 5 mg 5 mg 5 mg   Fri 7.5 mg 7.5 mg 7.5 mg   Sat 5 mg 5 mg 5 mg   Visit Report - - -   Some recent data might be hidden       Plan:  1. INR is  therapeutic today- see above in Anticoagulation Summary.   Will instruct Diego Martinez to continue their warfarin regimen- see above in Anticoagulation Summary.  2. Follow up in 4 weeks.  3. Patient declines warfarin refills.  4. Verbal and written information provided. Patient expresses understanding and has no further questions at this time.    Tabitha Courtney

## 2019-03-13 ENCOUNTER — ANTICOAGULATION VISIT (OUTPATIENT)
Dept: PHARMACY | Facility: HOSPITAL | Age: 74
End: 2019-03-13

## 2019-03-13 DIAGNOSIS — I48.20 CHRONIC ATRIAL FIBRILLATION (HCC): ICD-10-CM

## 2019-03-13 LAB
INR PPP: 1.9 (ref 0.91–1.09)
PROTHROMBIN TIME: 23 SECONDS (ref 10–13.8)

## 2019-03-13 PROCEDURE — 85610 PROTHROMBIN TIME: CPT

## 2019-03-13 PROCEDURE — 36416 COLLJ CAPILLARY BLOOD SPEC: CPT

## 2019-03-13 NOTE — PROGRESS NOTES
Anticoagulation Clinic Progress Note    Anticoagulation Summary  As of 3/13/2019    INR goal:   2.0-3.0   TTR:   91.8 % (6.7 mo)   INR used for dosin.9! (3/13/2019)   Warfarin maintenance plan:   7.5 mg on Fri; 5 mg all other days   Weekly warfarin total:   37.5 mg   Plan last modified:   Thelma Gonzalez [facility] (2018)   Next INR check:   4/10/2019   Priority:   Maintenance   Target end date:   Indefinite    Indications    Chronic atrial fibrillation (CMS/HCC) [I48.2]             Anticoagulation Episode Summary     INR check location:       Preferred lab:       Send INR reminders to:    [facility]    Comments:         Anticoagulation Care Providers     Provider Role Specialty Phone number    Chuck Conner MD Referring Cardiology [phone]          Clinic Interview:      INR History:  Anticoagulation Monitoring 2018 2019 3/13/2019   INR 2.1 2.1 1.9   INR Date 2018 2019 3/13/2019   INR Goal 2.0-3.0 2.0-3.0 2.0-3.0   Trend Same Same Same   Last Week Total 37.5 mg 37.5 mg 37.5 mg   Next Week Total 37.5 mg 37.5 mg 37.5 mg   Sun 5 mg 5 mg 5 mg   Mon 5 mg 5 mg 5 mg   Tue 5 mg 5 mg 5 mg   Wed 5 mg 5 mg 5 mg   Thu 5 mg 5 mg 5 mg   Fri 7.5 mg 7.5 mg 7.5 mg   Sat 5 mg 5 mg 5 mg   Visit Report - - -   Some recent data might be hidden       Plan:  1. INR is Subtherapeutic today- see above in Anticoagulation Summary. Pt has been stable past few months at low end of range.  Will instruct Diego Martinez to Continue their warfarin regimen- see above in Anticoagulation Summary.  2. Follow up in 1 month  3. Patient declines warfarin refills.  4. Verbal and written information provided. Patient expresses understanding and has no further questions at this time.    Renetta Wood [facility]

## 2019-05-01 ENCOUNTER — ANTICOAGULATION VISIT (OUTPATIENT)
Dept: PHARMACY | Facility: HOSPITAL | Age: 74
End: 2019-05-01

## 2019-05-01 DIAGNOSIS — I48.20 CHRONIC ATRIAL FIBRILLATION (HCC): ICD-10-CM

## 2019-05-01 LAB
INR PPP: 1.5 (ref 0.91–1.09)
PROTHROMBIN TIME: 18.6 SECONDS (ref 10–13.8)

## 2019-05-01 PROCEDURE — 85610 PROTHROMBIN TIME: CPT

## 2019-05-01 PROCEDURE — 36416 COLLJ CAPILLARY BLOOD SPEC: CPT

## 2019-05-01 PROCEDURE — G0463 HOSPITAL OUTPT CLINIC VISIT: HCPCS

## 2019-05-01 NOTE — PROGRESS NOTES
Anticoagulation Clinic Progress Note    Anticoagulation Summary  As of 2019    INR goal:   2.0-3.0   TTR:   74.0 % (8.4 mo)   INR used for dosin.5! (2019)   Warfarin maintenance plan:   7.5 mg every Fri; 5 mg all other days   Weekly warfarin total:   37.5 mg   Plan last modified:   Thelma Gonzalez RPH (2018)   Next INR check:   5/15/2019   Priority:   Maintenance   Target end date:   Indefinite    Indications    Chronic atrial fibrillation (CMS/HCC) [I48.2]             Anticoagulation Episode Summary     INR check location:       Preferred lab:       Send INR reminders to:   [facility]    Comments:         Anticoagulation Care Providers     Provider Role Specialty Phone number    Chuck Conner MD Referring Cardiology [phone]          Clinic Interview:  Patient Findings     Positives:   Missed doses    Negatives:   Signs/symptoms of thrombosis, Signs/symptoms of bleeding,   Laboratory test error suspected, Change in health, Change in alcohol use,   Change in activity, Upcoming invasive procedure, Emergency department   visit, Upcoming dental procedure, Extra doses, Change in medications,   Change in diet/appetite, Hospital admission, Bruising, Other complaints    Comments:   Missed 7.5 mg dose last Fri.       Clinical Outcomes     Negatives:   Major bleeding event, Thromboembolic event,   Anticoagulation-related hospital admission, Anticoagulation-related ED   visit, Anticoagulation-related fatality    Comments:   Missed 7.5 mg dose last Fri.         INR History:  Anticoagulation Monitoring 2019 3/13/2019 2019   INR 2.1 1.9 1.5   INR Date 2019 3/13/2019 2019   INR Goal 2.0-3.0 2.0-3.0 2.0-3.0   Trend Same Same Same   Last Week Total 37.5 mg 37.5 mg 30 mg   Next Week Total 37.5 mg 37.5 mg 40 mg   Sun 5 mg 5 mg 5 mg   Mon 5 mg 5 mg 5 mg   Tue 5 mg 5 mg 5 mg   Wed 5 mg 5 mg 7.5 mg (); Otherwise 5 mg   Thu 5 mg 5 mg 5 mg   Fri 7.5 mg 7.5 mg 7.5 mg   Sat 5  mg 5 mg 5 mg   Visit Report - - -   Some recent data might be hidden       Plan:  1. INR is Subtherapeutic today- see above in Anticoagulation Summary.  Will instruct Diego Martinez to Change their warfarin regimen- see above in Anticoagulation Summary.  2. Follow up in 2 weeks  3. Patient declines warfarin refills.  4. Verbal and written information provided. Patient expresses understanding and has no further questions at this time.    Amrit Lang RPH

## 2019-05-15 ENCOUNTER — ANTICOAGULATION VISIT (OUTPATIENT)
Dept: PHARMACY | Facility: HOSPITAL | Age: 74
End: 2019-05-15

## 2019-05-15 DIAGNOSIS — I48.20 CHRONIC ATRIAL FIBRILLATION (HCC): ICD-10-CM

## 2019-05-15 LAB
INR PPP: 2.5 (ref 0.91–1.09)
PROTHROMBIN TIME: 30.4 SECONDS (ref 10–13.8)

## 2019-05-15 PROCEDURE — 36416 COLLJ CAPILLARY BLOOD SPEC: CPT

## 2019-05-15 PROCEDURE — 85610 PROTHROMBIN TIME: CPT

## 2019-05-15 NOTE — PROGRESS NOTES
Anticoagulation Clinic Progress Note    Anticoagulation Summary  As of 5/15/2019    INR goal:   2.0-3.0   TTR:   72.7 % (8.8 mo)   INR used for dosin.5 (5/15/2019)   Warfarin maintenance plan:   7.5 mg every Fri; 5 mg all other days   Weekly warfarin total:   37.5 mg   No change documented:   Tabitha Courtney   Plan last modified:   Thelma Gonzalez [facility] (2018)   Next INR check:   2019   Priority:   Maintenance   Target end date:   Indefinite    Indications    Chronic atrial fibrillation (CMS/[facility]) [I48.2]             Anticoagulation Episode Summary     INR check location:       Preferred lab:       Send INR reminders to:    [facility]    Comments:         Anticoagulation Care Providers     Provider Role Specialty Phone number    Chuck Conner MD Referring Cardiology [phone]          Clinic Interview:  Patient Findings     Negatives:   Signs/symptoms of thrombosis, Signs/symptoms of bleeding,   Laboratory test error suspected, Change in health, Change in alcohol use,   Change in activity, Upcoming invasive procedure, Emergency department   visit, Upcoming dental procedure, Missed doses, Extra doses, Change in   medications, Change in diet/appetite, Hospital admission, Bruising, Other   complaints      Clinical Outcomes     Negatives:   Major bleeding event, Thromboembolic event,   Anticoagulation-related hospital admission, Anticoagulation-related ED   visit, Anticoagulation-related fatality        INR History:  Anticoagulation Monitoring 3/13/2019 2019 5/15/2019   INR 1.9 1.5 2.5   INR Date 3/13/2019 2019 5/15/2019   INR Goal 2.0-3.0 2.0-3.0 2.0-3.0   Trend Same Same Same   Last Week Total 37.5 mg 30 mg 37.5 mg   Next Week Total 37.5 mg 40 mg 37.5 mg   Sun 5 mg 5 mg 5 mg   Mon 5 mg 5 mg 5 mg   Tue 5 mg 5 mg 5 mg   Wed 5 mg 7.5 mg (); Otherwise 5 mg 5 mg   Thu 5 mg 5 mg 5 mg   Fri 7.5 mg 7.5 mg 7.5 mg   Sat 5 mg 5 mg 5 mg   Visit Report - - -   Some recent data  might be hidden       Plan:  1. INR is therapeutic today- see above in Anticoagulation Summary.   Will instruct Diego Martinez to continue their warfarin regimen- see above in Anticoagulation Summary.  2. Follow up in 4 weeks.  3. Patient declines warfarin refills.  4. Verbal and written information provided. Patient expresses understanding and has no further questions at this time.    Tabitha Courtney

## 2019-06-14 ENCOUNTER — ANTICOAGULATION VISIT (OUTPATIENT)
Dept: PHARMACY | Facility: HOSPITAL | Age: 74
End: 2019-06-14

## 2019-06-14 DIAGNOSIS — I48.20 CHRONIC ATRIAL FIBRILLATION (HCC): ICD-10-CM

## 2019-06-14 LAB
INR PPP: 2.2 (ref 0.91–1.09)
PROTHROMBIN TIME: 26.8 SECONDS (ref 10–13.8)

## 2019-06-14 PROCEDURE — 85610 PROTHROMBIN TIME: CPT

## 2019-06-14 PROCEDURE — 36416 COLLJ CAPILLARY BLOOD SPEC: CPT

## 2019-06-14 NOTE — PROGRESS NOTES
Anticoagulation Clinic Progress Note    Anticoagulation Summary  As of 2019    INR goal:   2.0-3.0   TTR:   75.5 % (9.8 mo)   INR used for dosin.2 (2019)   Warfarin maintenance plan:   7.5 mg every Fri; 5 mg all other days   Weekly warfarin total:   37.5 mg   No change documented:   Lilly Burger   Plan last modified:   Thelma Gonzalez [facility] (2018)   Next INR check:   2019   Priority:   Maintenance   Target end date:   Indefinite    Indications    Chronic atrial fibrillation (CMS/[facility]) [I48.2]             Anticoagulation Episode Summary     INR check location:       Preferred lab:       Send INR reminders to:    [facility]    Comments:         Anticoagulation Care Providers     Provider Role Specialty Phone number    Chuck Conner MD Referring Cardiology [phone]          Clinic Interview:  Patient Findings     Negatives:   Signs/symptoms of thrombosis, Signs/symptoms of bleeding,   Laboratory test error suspected, Change in health, Change in alcohol use,   Change in activity, Upcoming invasive procedure, Emergency department   visit, Upcoming dental procedure, Missed doses, Extra doses, Change in   medications, Change in diet/appetite, Hospital admission, Bruising, Other   complaints      Clinical Outcomes     Negatives:   Major bleeding event, Thromboembolic event,   Anticoagulation-related hospital admission, Anticoagulation-related ED   visit, Anticoagulation-related fatality        INR History:  Anticoagulation Monitoring 2019 5/15/2019 2019   INR 1.5 2.5 2.2   INR Date 2019 5/15/2019 2019   INR Goal 2.0-3.0 2.0-3.0 2.0-3.0   Trend Same Same Same   Last Week Total 30 mg 37.5 mg 37.5 mg   Next Week Total 40 mg 37.5 mg 37.5 mg   Sun 5 mg 5 mg 5 mg   Mon 5 mg 5 mg 5 mg   Tue 5 mg 5 mg 5 mg   Wed 7.5 mg (); Otherwise 5 mg 5 mg 5 mg   Thu 5 mg 5 mg 5 mg   Fri 7.5 mg 7.5 mg 7.5 mg   Sat 5 mg 5 mg 5 mg   Visit Report - - -   Some recent  data might be hidden       Plan:  1. INR is therapeutic today- see above in Anticoagulation Summary.   Will instruct Diego Martinez to continue their warfarin regimen- see above in Anticoagulation Summary.  2. Follow up in 4 weeks.  3. Patient declines warfarin refills.  4. Verbal and written information provided. Patient expresses understanding and has no further questions at this time.    Lilly Burger

## 2019-07-22 ENCOUNTER — ANTICOAGULATION VISIT (OUTPATIENT)
Dept: PHARMACY | Facility: HOSPITAL | Age: 74
End: 2019-07-22

## 2019-07-22 DIAGNOSIS — I48.20 CHRONIC ATRIAL FIBRILLATION (HCC): ICD-10-CM

## 2019-07-22 LAB
INR PPP: 3 (ref 0.91–1.09)
PROTHROMBIN TIME: 35.5 SECONDS (ref 10–13.8)

## 2019-07-22 PROCEDURE — 85610 PROTHROMBIN TIME: CPT

## 2019-07-22 PROCEDURE — 36416 COLLJ CAPILLARY BLOOD SPEC: CPT

## 2019-07-22 NOTE — PROGRESS NOTES
Anticoagulation Clinic Progress Note    Anticoagulation Summary  As of 7/22/2019    INR goal:   2.0-3.0   TTR:   78.3 % (11.1 mo)   INR used for dosing:   3.0 (7/22/2019)   Warfarin maintenance plan:   7.5 mg every Fri; 5 mg all other days   Weekly warfarin total:   37.5 mg   No change documented:   Tabitha Courtney   Plan last modified:   Thelma Gonzalez [facility] (8/13/2018)   Next INR check:   8/19/2019   Priority:   Maintenance   Target end date:   Indefinite    Indications    Chronic atrial fibrillation (CMS/[facility]) [I48.2]             Anticoagulation Episode Summary     INR check location:       Preferred lab:       Send INR reminders to:    [facility]    Comments:         Anticoagulation Care Providers     Provider Role Specialty Phone number    Chuck Conner MD Referring Cardiology [phone]          Clinic Interview:  Patient Findings     Negatives:   Signs/symptoms of thrombosis, Signs/symptoms of bleeding,   Laboratory test error suspected, Change in health, Change in alcohol use,   Change in activity, Upcoming invasive procedure, Emergency department   visit, Upcoming dental procedure, Missed doses, Extra doses, Change in   medications, Change in diet/appetite, Hospital admission, Bruising, Other   complaints      Clinical Outcomes     Negatives:   Major bleeding event, Thromboembolic event,   Anticoagulation-related hospital admission, Anticoagulation-related ED   visit, Anticoagulation-related fatality        INR History:  Anticoagulation Monitoring 5/15/2019 6/14/2019 7/22/2019   INR 2.5 2.2 3.0   INR Date 5/15/2019 6/14/2019 7/22/2019   INR Goal 2.0-3.0 2.0-3.0 2.0-3.0   Trend Same Same Same   Last Week Total 37.5 mg 37.5 mg 37.5 mg   Next Week Total 37.5 mg 37.5 mg 37.5 mg   Sun 5 mg 5 mg 5 mg   Mon 5 mg 5 mg 5 mg   Tue 5 mg 5 mg 5 mg   Wed 5 mg 5 mg 5 mg   Thu 5 mg 5 mg 5 mg   Fri 7.5 mg 7.5 mg 7.5 mg   Sat 5 mg 5 mg 5 mg   Visit Report - - -   Some recent data might be hidden        Plan:  1. INR is therapeutic today- see above in Anticoagulation Summary.   Will instruct Diego Martinez to continue their warfarin regimen- see above in Anticoagulation Summary.  2. Follow up in 4 weeks.  3. Patient declines warfarin refills.  4. Verbal and written information provided. Patient expresses understanding and has no further questions at this time.    Tabitha Courtney

## 2019-07-29 RX ORDER — WARFARIN SODIUM 5 MG/1
TABLET ORAL
Qty: 90 TABLET | Refills: 0 | Status: SHIPPED | OUTPATIENT
Start: 2019-07-29 | End: 2019-08-02 | Stop reason: SDUPTHER

## 2019-08-02 RX ORDER — WARFARIN SODIUM 5 MG/1
TABLET ORAL
Qty: 100 TABLET | Refills: 0 | Status: SHIPPED | OUTPATIENT
Start: 2019-08-02 | End: 2019-11-02 | Stop reason: SDUPTHER

## 2019-08-23 ENCOUNTER — ANTICOAGULATION VISIT (OUTPATIENT)
Dept: PHARMACY | Facility: HOSPITAL | Age: 74
End: 2019-08-23

## 2019-08-23 DIAGNOSIS — I48.20 CHRONIC ATRIAL FIBRILLATION (HCC): ICD-10-CM

## 2019-08-23 LAB
INR PPP: 2.5 (ref 0.91–1.09)
PROTHROMBIN TIME: 30.4 SECONDS (ref 10–13.8)

## 2019-08-23 PROCEDURE — 85610 PROTHROMBIN TIME: CPT

## 2019-08-23 PROCEDURE — 36416 COLLJ CAPILLARY BLOOD SPEC: CPT

## 2019-08-23 NOTE — PROGRESS NOTES
Anticoagulation Clinic Progress Note    Anticoagulation Summary  As of 2019    INR goal:   2.0-3.0   TTR:   80.2 % (1 y)   INR used for dosin.5 (2019)   Warfarin maintenance plan:   7.5 mg every Fri; 5 mg all other days   Weekly warfarin total:   37.5 mg   No change documented:   Lilly Burger   Plan last modified:   Thelma Gonzalez [facility] (2018)   Next INR check:   2019   Priority:   Maintenance   Target end date:   Indefinite    Indications    Chronic atrial fibrillation (CMS/[facility]) [I48.2]             Anticoagulation Episode Summary     INR check location:       Preferred lab:       Send INR reminders to:    [facility]    Comments:         Anticoagulation Care Providers     Provider Role Specialty Phone number    Chuck Conner MD Referring Cardiology [phone]          Clinic Interview:  Patient Findings     Negatives:   Signs/symptoms of thrombosis, Signs/symptoms of bleeding,   Laboratory test error suspected, Change in health, Change in alcohol use,   Change in activity, Upcoming invasive procedure, Emergency department   visit, Upcoming dental procedure, Missed doses, Extra doses, Change in   medications, Change in diet/appetite, Hospital admission, Bruising, Other   complaints      Clinical Outcomes     Negatives:   Major bleeding event, Thromboembolic event,   Anticoagulation-related hospital admission, Anticoagulation-related ED   visit, Anticoagulation-related fatality        INR History:  Anticoagulation Monitoring 2019   INR 2.2 3.0 2.5   INR Date 2019   INR Goal 2.0-3.0 2.0-3.0 2.0-3.0   Trend Same Same Same   Last Week Total 37.5 mg 37.5 mg 37.5 mg   Next Week Total 37.5 mg 37.5 mg 37.5 mg   Sun 5 mg 5 mg 5 mg   Mon 5 mg 5 mg 5 mg   Tue 5 mg 5 mg 5 mg   Wed 5 mg 5 mg 5 mg   Thu 5 mg 5 mg 5 mg   Fri 7.5 mg 7.5 mg 7.5 mg   Sat 5 mg 5 mg 5 mg   Visit Report - - -   Some recent data might be hidden        Plan:  1. INR is therapeutic today- see above in Anticoagulation Summary.   Will instruct Diego Martinez to continue their warfarin regimen- see above in Anticoagulation Summary.  2. Follow up in 3 weeks.  3. Patient declines warfarin refills.  4. Verbal and written information provided. Patient expresses understanding and has no further questions at this time.    Lilly Burger

## 2019-09-03 RX ORDER — AMLODIPINE BESYLATE 5 MG/1
TABLET ORAL
Qty: 30 TABLET | Refills: 10 | Status: SHIPPED | OUTPATIENT
Start: 2019-09-03 | End: 2020-09-16

## 2019-09-18 ENCOUNTER — ANTICOAGULATION VISIT (OUTPATIENT)
Dept: PHARMACY | Facility: HOSPITAL | Age: 74
End: 2019-09-18

## 2019-09-18 DIAGNOSIS — I48.20 CHRONIC ATRIAL FIBRILLATION (HCC): ICD-10-CM

## 2019-09-18 LAB
INR PPP: 2.2 (ref 0.91–1.09)
PROTHROMBIN TIME: 26.9 SECONDS (ref 10–13.8)

## 2019-09-18 PROCEDURE — 36416 COLLJ CAPILLARY BLOOD SPEC: CPT

## 2019-09-18 PROCEDURE — 85610 PROTHROMBIN TIME: CPT

## 2019-09-18 NOTE — PATIENT INSTRUCTIONS
Check with MD office for colonoscopy --they will be contacting [place] [facility] to confirm how long they want you to hold warfarin.  We will see you 1 week after restarting warfarin post-colonoscopy.

## 2019-09-18 NOTE — PROGRESS NOTES
Anticoagulation Clinic Progress Note    Anticoagulation Summary  As of 2019    INR goal:   2.0-3.0   TTR:   81.5 % (1.1 y)   INR used for dosin.2 (2019)   Warfarin maintenance plan:   7.5 mg every Fri; 5 mg all other days   Weekly warfarin total:   37.5 mg   Plan last modified:   Thelma Gonzalez RP (2018)   Next INR check:   10/23/2019   Priority:   Maintenance   Target end date:   Indefinite    Indications    Chronic atrial fibrillation (CMS/HCC) [I48.2]             Anticoagulation Episode Summary     INR check location:       Preferred lab:       Send INR reminders to:    [facility]    Comments:         Anticoagulation Care Providers     Provider Role Specialty Phone number    Chuck Conner MD Referring Cardiology [phone]          Clinic Interview:      INR History:  Anticoagulation Monitoring 2019   INR 3.0 2.5 2.2   INR Date 2019   INR Goal 2.0-3.0 2.0-3.0 2.0-3.0   Trend Same Same Same   Last Week Total 37.5 mg 37.5 mg 37.5 mg   Next Week Total 37.5 mg 37.5 mg 37.5 mg   Sun 5 mg 5 mg 5 mg   Mon 5 mg 5 mg 5 mg   Tue 5 mg 5 mg 5 mg   Wed 5 mg 5 mg 7.5 mg (10/16); Otherwise 5 mg   Thu 5 mg 5 mg 7.5 mg (10/17); Otherwise 5 mg   Fri 7.5 mg 7.5 mg 7.5 mg   Sat 5 mg 5 mg 5 mg   Visit Report - - -   Some recent data might be hidden       Plan:  1. INR is Therapeutic today- see above in Anticoagulation Summary.  Will instruct Diego Martinez to Continue their warfarin regimen- see above in Anticoagulation Summary.  2. Follow up in 1 week--after holding for colonoscopy planned 10/16/19  3. Patient declines warfarin refills.  4. Verbal and written information provided. Patient expresses understanding and has no further questions at this time.    Renetta Wood [facility]

## 2019-09-30 RX ORDER — METOPROLOL SUCCINATE 25 MG/1
TABLET, EXTENDED RELEASE ORAL
Qty: 30 TABLET | Refills: 7 | Status: SHIPPED | OUTPATIENT
Start: 2019-09-30 | End: 2020-06-15

## 2019-10-23 ENCOUNTER — ANTICOAGULATION VISIT (OUTPATIENT)
Dept: PHARMACY | Facility: HOSPITAL | Age: 74
End: 2019-10-23

## 2019-10-23 DIAGNOSIS — I48.20 CHRONIC ATRIAL FIBRILLATION (HCC): ICD-10-CM

## 2019-10-23 LAB
INR PPP: 1.8 (ref 0.91–1.09)
PROTHROMBIN TIME: 21.6 SECONDS (ref 10–13.8)

## 2019-10-23 PROCEDURE — 85610 PROTHROMBIN TIME: CPT

## 2019-10-23 PROCEDURE — 36416 COLLJ CAPILLARY BLOOD SPEC: CPT

## 2019-10-23 PROCEDURE — G0463 HOSPITAL OUTPT CLINIC VISIT: HCPCS

## 2019-10-23 NOTE — PROGRESS NOTES
Anticoagulation Clinic Progress Note    Anticoagulation Summary  As of 10/23/2019    INR goal:   2.0-3.0   TTR:   78.9 % (1.2 y)   INR used for dosin.8! (10/23/2019)   Warfarin maintenance plan:   7.5 mg every Fri; 5 mg all other days   Weekly warfarin total:   37.5 mg   Plan last modified:   Thelma Gonzalez RPH (2018)   Next INR check:   2019   Priority:   Maintenance   Target end date:   Indefinite    Indications    Chronic atrial fibrillation [I48.20]             Anticoagulation Episode Summary     INR check location:       Preferred lab:       Send INR reminders to:    [facility] [place]    Comments:         Anticoagulation Care Providers     Provider Role Specialty Phone number    Chuck Conner MD Referring Cardiology [phone]          Clinic Interview:      INR History:  Anticoagulation Monitoring 2019 2019 10/23/2019   INR 2.5 2.2 1.8   INR Date 2019 2019 10/23/2019   INR Goal 2.0-3.0 2.0-3.0 2.0-3.0   Trend Same Same Same   Last Week Total 37.5 mg 37.5 mg 42.5 mg   Next Week Total 37.5 mg 37.5 mg 40 mg   Sun 5 mg 5 mg 5 mg   Mon 5 mg 5 mg 5 mg   Tue 5 mg 5 mg 5 mg   Wed 5 mg 7.5 mg (10/16); Otherwise 5 mg 7.5 mg (10/23); Otherwise 5 mg   Thu 5 mg 7.5 mg (10/17); Otherwise 5 mg 5 mg   Fri 7.5 mg 7.5 mg 7.5 mg   Sat 5 mg 5 mg 5 mg   Visit Report - - -   Some recent data might be hidden       Plan:  1. INR is subtherapeutic today- see above in Anticoagulation Summary.   Will instruct Diego Martinez to take 7.5 Mg today then continue on normal dosing of 7.5 mg Fri, 5 mg all other days. - see above in Anticoagulation Summary.  2. Follow up in 3 weeks.  3. Patient declines warfarin refills.  4. Verbal and written information provided. Patient expresses understanding and has no further questions at this time.    Tabitha Courtney

## 2019-11-04 RX ORDER — WARFARIN SODIUM 5 MG/1
TABLET ORAL
Qty: 100 TABLET | Refills: 0 | Status: SHIPPED | OUTPATIENT
Start: 2019-11-04 | End: 2020-01-02

## 2019-11-18 ENCOUNTER — ANTICOAGULATION VISIT (OUTPATIENT)
Dept: PHARMACY | Facility: HOSPITAL | Age: 74
End: 2019-11-18

## 2019-11-18 DIAGNOSIS — I48.20 CHRONIC ATRIAL FIBRILLATION (HCC): ICD-10-CM

## 2019-11-18 LAB
INR PPP: 3 (ref 0.91–1.09)
PROTHROMBIN TIME: 35.7 SECONDS (ref 10–13.8)

## 2019-11-18 PROCEDURE — 36416 COLLJ CAPILLARY BLOOD SPEC: CPT

## 2019-11-18 PROCEDURE — 85610 PROTHROMBIN TIME: CPT

## 2019-11-18 NOTE — PROGRESS NOTES
Anticoagulation Clinic Progress Note    Anticoagulation Summary  As of 11/18/2019    INR goal:   2.0-3.0   TTR:   79.2 % (1.2 y)   INR used for dosing:   3.0 (11/18/2019)   Warfarin maintenance plan:   7.5 mg every Fri; 5 mg all other days   Weekly warfarin total:   37.5 mg   Plan last modified:   Thelma Gonzalez RPH (8/13/2018)   Next INR check:   12/2/2019   Priority:   Maintenance   Target end date:   Indefinite    Indications    Chronic atrial fibrillation [I48.20]             Anticoagulation Episode Summary     INR check location:       Preferred lab:       Send INR reminders to:    [facility] [place]    Comments:         Anticoagulation Care Providers     Provider Role Specialty Phone number    Chuck Conner MD Referring Cardiology [phone]          Clinic Interview:      INR History:  Anticoagulation Monitoring 9/18/2019 10/23/2019 11/18/2019   INR 2.2 1.8 3.0   INR Date 9/18/2019 10/23/2019 11/18/2019   INR Goal 2.0-3.0 2.0-3.0 2.0-3.0   Trend Same Same Same   Last Week Total 37.5 mg 42.5 mg 37.5 mg   Next Week Total 37.5 mg 40 mg 37.5 mg   Sun 5 mg 5 mg 5 mg   Mon 5 mg 5 mg 5 mg   Tue 5 mg 5 mg 5 mg   Wed 7.5 mg (10/16); Otherwise 5 mg 7.5 mg (10/23); Otherwise 5 mg 5 mg   Thu 7.5 mg (10/17); Otherwise 5 mg 5 mg 5 mg   Fri 7.5 mg 7.5 mg 7.5 mg   Sat 5 mg 5 mg 5 mg   Visit Report - - -   Some recent data might be hidden       Plan:  1. INR is Therapeutic today- see above in Anticoagulation Summary.  Will instruct Diego Martinez to Continue their warfarin regimen- see above in Anticoagulation Summary.  2. Follow up in 2 weeks  3. Patient declines warfarin refills.  4. Verbal and written information provided. Patient expresses understanding and has no further questions at this time.    Fred Khan, Pharmacy Intern

## 2019-11-18 NOTE — PROGRESS NOTES
Anticoagulation Clinic Progress Note    Anticoagulation Summary  As of 11/18/2019    INR goal:   2.0-3.0   TTR:   79.2 % (1.2 y)   INR used for dosing:   3.0 (11/18/2019)   Warfarin maintenance plan:   7.5 mg every Fri; 5 mg all other days   Weekly warfarin total:   37.5 mg   No change documented:   Fred Khan, Pharmacy Intern   Plan last modified:   Thelma Gonzalez RPH (8/13/2018)   Next INR check:   12/2/2019   Priority:   Maintenance   Target end date:   Indefinite    Indications    Chronic atrial fibrillation [I48.20]             Anticoagulation Episode Summary     INR check location:       Preferred lab:       Send INR reminders to:    [facility]    Comments:         Anticoagulation Care Providers     Provider Role Specialty Phone number    Chuck Conner MD Referring Cardiology [phone]          Clinic Interview:  Patient Findings     Negatives:   Signs/symptoms of thrombosis, Signs/symptoms of bleeding,   Laboratory test error suspected, Change in health, Change in alcohol use,   Change in activity, Upcoming invasive procedure, Emergency department   visit, Upcoming dental procedure, Missed doses, Extra doses, Change in   medications, Change in diet/appetite, Hospital admission, Bruising, Other   complaints      Clinical Outcomes     Negatives:   Major bleeding event, Thromboembolic event,   Anticoagulation-related hospital admission, Anticoagulation-related ED   visit, Anticoagulation-related fatality        INR History:  Anticoagulation Monitoring 9/18/2019 10/23/2019 11/18/2019   INR 2.2 1.8 3.0   INR Date 9/18/2019 10/23/2019 11/18/2019   INR Goal 2.0-3.0 2.0-3.0 2.0-3.0   Trend Same Same Same   Last Week Total 37.5 mg 42.5 mg 37.5 mg   Next Week Total 37.5 mg 40 mg 37.5 mg   Sun 5 mg 5 mg 5 mg   Mon 5 mg 5 mg 5 mg   Tue 5 mg 5 mg 5 mg   Wed 7.5 mg (10/16); Otherwise 5 mg 7.5 mg (10/23); Otherwise 5 mg 5 mg   Thu 7.5 mg (10/17); Otherwise 5 mg 5 mg 5 mg   Fri 7.5 mg 7.5 mg  7.5 mg   Sat 5 mg 5 mg 5 mg   Visit Report - - -   Some recent data might be hidden       Plan:  1. INR is Therapeutic today- see above in Anticoagulation Summary.  Will instruct Diego Martinez to Continue their warfarin regimen- see above in Anticoagulation Summary.  2. Follow up in 2 weeks  3. Patient declines warfarin refills.  4. Verbal and written information provided. Patient expresses understanding and has no further questions at this time.    Fred Khan, Pharmacy Intern

## 2019-11-19 NOTE — PROGRESS NOTES
I have supervised and reviewed the notes, assessments, and/or procedures performed by Crowin Khan, PharmD Candidate. I concur with his documentation of this patient.    Amrit Lang RP

## 2019-12-10 ENCOUNTER — ANTICOAGULATION VISIT (OUTPATIENT)
Dept: PHARMACY | Facility: HOSPITAL | Age: 74
End: 2019-12-10

## 2019-12-10 DIAGNOSIS — I48.20 CHRONIC ATRIAL FIBRILLATION (HCC): ICD-10-CM

## 2019-12-10 LAB
INR PPP: 2.4 (ref 0.91–1.09)
PROTHROMBIN TIME: 28.9 SECONDS (ref 10–13.8)

## 2019-12-10 PROCEDURE — 85610 PROTHROMBIN TIME: CPT

## 2019-12-10 PROCEDURE — 36416 COLLJ CAPILLARY BLOOD SPEC: CPT

## 2019-12-10 NOTE — PROGRESS NOTES
Anticoagulation Clinic Progress Note    Anticoagulation Summary  As of 12/10/2019    INR goal:   2.0-3.0   TTR:   80.1 % (1.3 y)   INR used for dosin.4 (12/10/2019)   Warfarin maintenance plan:   7.5 mg every Fri; 5 mg all other days   Weekly warfarin total:   37.5 mg   No change documented:   Joana Hirsch   Plan last modified:   Thelma Gonzalez [facility] (2018)   Next INR check:   2020   Priority:   Maintenance   Target end date:   Indefinite    Indications    Chronic atrial fibrillation [I48.20]             Anticoagulation Episode Summary     INR check location:       Preferred lab:       Send INR reminders to:    [facility]    Comments:         Anticoagulation Care Providers     Provider Role Specialty Phone number    Chuck Conner MD Referring Cardiology [phone]          Clinic Interview:  Patient Findings     Negatives:   Signs/symptoms of thrombosis, Signs/symptoms of bleeding,   Laboratory test error suspected, Change in health, Change in alcohol use,   Change in activity, Upcoming invasive procedure, Emergency department   visit, Upcoming dental procedure, Missed doses, Extra doses, Change in   medications, Change in diet/appetite, Hospital admission, Bruising, Other   complaints      Clinical Outcomes     Negatives:   Major bleeding event, Thromboembolic event,   Anticoagulation-related hospital admission, Anticoagulation-related ED   visit, Anticoagulation-related fatality        INR History:  Anticoagulation Monitoring 10/23/2019 2019 12/10/2019   INR 1.8 3.0 2.4   INR Date 10/23/2019 2019 12/10/2019   INR Goal 2.0-3.0 2.0-3.0 2.0-3.0   Trend Same Same Same   Last Week Total 42.5 mg 37.5 mg 37.5 mg   Next Week Total 40 mg 37.5 mg 37.5 mg   Sun 5 mg 5 mg 5 mg   Mon 5 mg 5 mg 5 mg   Tue 5 mg 5 mg 5 mg   Wed 7.5 mg (10/23); Otherwise 5 mg 5 mg 5 mg   Thu 5 mg 5 mg 5 mg   Fri 7.5 mg 7.5 mg 7.5 mg   Sat 5 mg 5 mg 5 mg   Visit Report - - -   Some recent data  might be hidden       Plan:  1. INR is therapeutic today- see above in Anticoagulation Summary.   Will instruct Diego Martinez to continue their warfarin regimen- see above in Anticoagulation Summary.  2. Follow up in 5 weeks.  3. Patient declines warfarin refills.  4. Verbal and written information provided. Patient expresses understanding and has no further questions at this time.    Joana Hirshc

## 2020-01-02 RX ORDER — WARFARIN SODIUM 5 MG/1
TABLET ORAL
Qty: 100 TABLET | Refills: 0 | Status: SHIPPED | OUTPATIENT
Start: 2020-01-02 | End: 2020-03-18 | Stop reason: SDUPTHER

## 2020-01-15 ENCOUNTER — ANTICOAGULATION VISIT (OUTPATIENT)
Dept: PHARMACY | Facility: HOSPITAL | Age: 75
End: 2020-01-15

## 2020-01-15 DIAGNOSIS — I48.20 CHRONIC ATRIAL FIBRILLATION (HCC): ICD-10-CM

## 2020-01-15 LAB
INR PPP: 2.1 (ref 0.91–1.09)
PROTHROMBIN TIME: 25.7 SECONDS (ref 10–13.8)

## 2020-01-15 PROCEDURE — 85610 PROTHROMBIN TIME: CPT

## 2020-01-15 PROCEDURE — 36416 COLLJ CAPILLARY BLOOD SPEC: CPT

## 2020-01-15 NOTE — PROGRESS NOTES
Anticoagulation Clinic Progress Note    Anticoagulation Summary  As of 1/15/2020    INR goal:   2.0-3.0   TTR:   81.5 % (1.4 y)   INR used for dosin.1 (1/15/2020)   Warfarin maintenance plan:   7.5 mg every Fri; 5 mg all other days   Weekly warfarin total:   37.5 mg   No change documented:   Tabitha Courtney   Plan last modified:   Thelma Gonzalez [facility] (2018)   Next INR check:   2020   Priority:   Maintenance   Target end date:   Indefinite    Indications    Chronic atrial fibrillation [I48.20]             Anticoagulation Episode Summary     INR check location:       Preferred lab:       Send INR reminders to:    [facility]    Comments:         Anticoagulation Care Providers     Provider Role Specialty Phone number    Chuck Conner MD Referring Cardiology [phone]          Clinic Interview:  Patient Findings     Negatives:   Signs/symptoms of thrombosis, Signs/symptoms of bleeding,   Laboratory test error suspected, Change in health, Change in alcohol use,   Change in activity, Upcoming invasive procedure, Emergency department   visit, Upcoming dental procedure, Missed doses, Extra doses, Change in   medications, Change in diet/appetite, Hospital admission, Bruising, Other   complaints      Clinical Outcomes     Negatives:   Major bleeding event, Thromboembolic event,   Anticoagulation-related hospital admission, Anticoagulation-related ED   visit, Anticoagulation-related fatality        INR History:  Anticoagulation Monitoring 2019 12/10/2019 1/15/2020   INR 3.0 2.4 2.1   INR Date 2019 12/10/2019 1/15/2020   INR Goal 2.0-3.0 2.0-3.0 2.0-3.0   Trend Same Same Same   Last Week Total 37.5 mg 37.5 mg 37.5 mg   Next Week Total 37.5 mg 37.5 mg 37.5 mg   Sun 5 mg 5 mg 5 mg   Mon 5 mg 5 mg 5 mg   Tue 5 mg 5 mg 5 mg   Wed 5 mg 5 mg 5 mg   Thu 5 mg 5 mg 5 mg   Fri 7.5 mg 7.5 mg 7.5 mg   Sat 5 mg 5 mg 5 mg   Visit Report - - -   Some recent data might be hidden        Plan:  1. INR is therapeutic today- see above in Anticoagulation Summary.   Will instruct Diego Martinez to continue their warfarin regimen- see above in Anticoagulation Summary.  2. Follow up in 5 weeks.  3. Patient declines warfarin refills.  4. Verbal and written information provided. Patient expresses understanding and has no further questions at this time.    Tabitha Courtney

## 2020-02-21 ENCOUNTER — ANTICOAGULATION VISIT (OUTPATIENT)
Dept: PHARMACY | Facility: HOSPITAL | Age: 75
End: 2020-02-21

## 2020-02-21 DIAGNOSIS — I48.20 CHRONIC ATRIAL FIBRILLATION (HCC): ICD-10-CM

## 2020-02-21 LAB
INR PPP: 1.6 (ref 0.91–1.09)
PROTHROMBIN TIME: 19.7 SECONDS (ref 10–13.8)

## 2020-02-21 PROCEDURE — 36416 COLLJ CAPILLARY BLOOD SPEC: CPT

## 2020-02-21 PROCEDURE — 85610 PROTHROMBIN TIME: CPT

## 2020-02-21 PROCEDURE — G0463 HOSPITAL OUTPT CLINIC VISIT: HCPCS

## 2020-02-21 NOTE — PROGRESS NOTES
Anticoagulation Clinic Progress Note    Anticoagulation Summary  As of 2020    INR goal:   2.0-3.0   TTR:   77.4 % (1.5 y)   INR used for dosin.6! (2020)   Warfarin maintenance plan:   7.5 mg every Fri; 5 mg all other days   Weekly warfarin total:   37.5 mg   Plan last modified:   Thelma Gonzalez RPH (2018)   Next INR check:   3/6/2020   Priority:   Maintenance   Target end date:   Indefinite    Indications    Chronic atrial fibrillation [I48.20]             Anticoagulation Episode Summary     INR check location:       Preferred lab:       Send INR reminders to:   [facility] [place]    Comments:         Anticoagulation Care Providers     Provider Role Specialty Phone number    Chuck Conner MD Referring Cardiology [phone]          Clinic Interview:  Patient Findings     Negatives:   Signs/symptoms of thrombosis, Signs/symptoms of bleeding,   Laboratory test error suspected, Change in health, Change in alcohol use,   Change in activity, Upcoming invasive procedure, Emergency department   visit, Upcoming dental procedure, Missed doses, Extra doses, Change in   medications, Change in diet/appetite, Hospital admission, Bruising, Other   complaints      Clinical Outcomes     Negatives:   Major bleeding event, Thromboembolic event,   Anticoagulation-related hospital admission, Anticoagulation-related ED   visit, Anticoagulation-related fatality        INR History:  Anticoagulation Monitoring 12/10/2019 1/15/2020 2020   INR 2.4 2.1 1.6   INR Date 12/10/2019 1/15/2020 2020   INR Goal 2.0-3.0 2.0-3.0 2.0-3.0   Trend Same Same Same   Last Week Total 37.5 mg 37.5 mg 32.5 mg   Next Week Total 37.5 mg 37.5 mg 42.5 mg   Sun 5 mg 5 mg 5 mg   Mon 5 mg 5 mg 5 mg   Tue 5 mg 5 mg 5 mg   Wed 5 mg 5 mg 5 mg   Thu 5 mg 5 mg 5 mg   Fri 7.5 mg 7.5 mg 10 mg (); Otherwise 7.5 mg   Sat 5 mg 5 mg 7.5 mg (); Otherwise 5 mg   Visit Report - - -   Some recent data might be hidden        Plan:  1. INR is out of range- see above in Anticoagulation Summary.   Will instruct Diego Martinez to Change  their warfarin regimen- see above in Anticoagulation Summary.  2. Follow up in 2 weeks.   3. Patient declines warfarin refills.  4. Verbal and written information provided. Patient expresses understanding and has no further questions at this time.    Joana Hirsch

## 2020-03-06 ENCOUNTER — ANTICOAGULATION VISIT (OUTPATIENT)
Dept: PHARMACY | Facility: HOSPITAL | Age: 75
End: 2020-03-06

## 2020-03-06 DIAGNOSIS — I48.20 CHRONIC ATRIAL FIBRILLATION (HCC): ICD-10-CM

## 2020-03-06 LAB
INR PPP: 2 (ref 0.91–1.09)
PROTHROMBIN TIME: 24.2 SECONDS (ref 10–13.8)

## 2020-03-06 PROCEDURE — 85610 PROTHROMBIN TIME: CPT

## 2020-03-06 PROCEDURE — 36416 COLLJ CAPILLARY BLOOD SPEC: CPT

## 2020-03-06 NOTE — PROGRESS NOTES
Anticoagulation Clinic Progress Note    Anticoagulation Summary  As of 3/6/2020    INR goal:   2.0-3.0   TTR:   75.4 % (1.5 y)   INR used for dosin.0 (3/6/2020)   Warfarin maintenance plan:   7.5 mg every Fri; 5 mg all other days   Weekly warfarin total:   37.5 mg   Plan last modified:   Thelma Gonzalez RPH (2018)   Next INR check:   4/3/2020   Priority:   Maintenance   Target end date:   Indefinite    Indications    Chronic atrial fibrillation [I48.20]             Anticoagulation Episode Summary     INR check location:       Preferred lab:       Send INR reminders to:   [facility] [place]    Comments:         Anticoagulation Care Providers     Provider Role Specialty Phone number    Chuck Conner MD Referring Cardiology [phone]          Clinic Interview:  Patient Findings     Negatives:   Signs/symptoms of thrombosis, Signs/symptoms of bleeding,   Laboratory test error suspected, Change in health, Change in alcohol use,   Change in activity, Upcoming invasive procedure, Emergency department   visit, Upcoming dental procedure, Missed doses, Extra doses, Change in   medications, Change in diet/appetite, Hospital admission, Bruising, Other   complaints      Clinical Outcomes     Negatives:   Major bleeding event, Thromboembolic event,   Anticoagulation-related hospital admission, Anticoagulation-related ED   visit, Anticoagulation-related fatality        INR History:  Anticoagulation Monitoring 1/15/2020 2020 3/6/2020   INR 2.1 1.6 2.0   INR Date 1/15/2020 2020 3/6/2020   INR Goal 2.0-3.0 2.0-3.0 2.0-3.0   Trend Same Same Same   Last Week Total 37.5 mg 32.5 mg 37.5 mg   Next Week Total 37.5 mg 42.5 mg 37.5 mg   Sun 5 mg 5 mg 5 mg   Mon 5 mg 5 mg 5 mg   Tue 5 mg 5 mg 5 mg   Wed 5 mg 5 mg 5 mg   Thu 5 mg 5 mg 5 mg   Fri 7.5 mg 10 mg (); Otherwise 7.5 mg 7.5 mg   Sat 5 mg 7.5 mg (); Otherwise 5 mg 5 mg   Visit Report - - -   Some recent data might be hidden       Plan:  1.  INR is Therapeutic today- see above in Anticoagulation Summary.  Will instruct Diego Martinez to Continue their warfarin regimen- see above in Anticoagulation Summary.  2. Follow up in 4 weeks  3. Patient declines warfarin refills.  4. Verbal and written information provided. Patient expresses understanding and has no further questions at this time.    Braden Wright, Pharmacy Intern

## 2020-03-06 NOTE — PROGRESS NOTES
I have supervised and reviewed the notes, assessments, and/or procedures performed by our PharmD Candidate. The documented assessment and plan were developed cooperatively. I concur with the documentation of this patient encounter.    Amrit Lang RP

## 2020-03-18 RX ORDER — WARFARIN SODIUM 5 MG/1
TABLET ORAL
Qty: 100 TABLET | Refills: 1 | Status: SHIPPED | OUTPATIENT
Start: 2020-03-18 | End: 2020-09-17

## 2020-04-16 ENCOUNTER — ANTICOAGULATION VISIT (OUTPATIENT)
Dept: PHARMACY | Facility: HOSPITAL | Age: 75
End: 2020-04-16

## 2020-04-16 DIAGNOSIS — I48.20 CHRONIC ATRIAL FIBRILLATION (HCC): ICD-10-CM

## 2020-04-16 LAB
INR PPP: 3 (ref 0.91–1.09)
PROTHROMBIN TIME: 35.4 SECONDS (ref 10–13.8)

## 2020-04-16 PROCEDURE — 36416 COLLJ CAPILLARY BLOOD SPEC: CPT

## 2020-04-16 PROCEDURE — 85610 PROTHROMBIN TIME: CPT

## 2020-04-16 NOTE — PROGRESS NOTES
I have reviewed the notes, assessments, and/or procedures performed by Tabitha Courtney, I concur with her/his documentation of Diego Martinez. RTC 6 wks due to stability on current warfarin regimen in setting of COVID-19 pandemic.

## 2020-04-16 NOTE — PROGRESS NOTES
Anticoagulation Clinic Progress Note    Anticoagulation Summary  As of 4/16/2020    INR goal:   2.0-3.0   TTR:   77.1 % (1.6 y)   INR used for dosing:   3.0 (4/16/2020)   Warfarin maintenance plan:   7.5 mg every Fri; 5 mg all other days   Weekly warfarin total:   37.5 mg   No change documented:   Tabitha Courtney   Plan last modified:   Thelma Gonzalez [facility] (8/13/2018)   Next INR check:   5/28/2020   Priority:   Maintenance   Target end date:   Indefinite    Indications    Chronic atrial fibrillation [I48.20]             Anticoagulation Episode Summary     INR check location:       Preferred lab:       Send INR reminders to:    [facility]    Comments:         Anticoagulation Care Providers     Provider Role Specialty Phone number    Chuck Conner MD Referring Cardiology [phone]          Clinic Interview:  Patient Findings     Negatives:   Signs/symptoms of thrombosis, Signs/symptoms of bleeding,   Laboratory test error suspected, Change in health, Change in alcohol use,   Change in activity, Upcoming invasive procedure, Emergency department   visit, Upcoming dental procedure, Missed doses, Extra doses, Change in   medications, Change in diet/appetite, Hospital admission, Bruising, Other   complaints      Clinical Outcomes     Negatives:   Major bleeding event, Thromboembolic event,   Anticoagulation-related hospital admission, Anticoagulation-related ED   visit, Anticoagulation-related fatality        INR History:  Anticoagulation Monitoring 2/21/2020 3/6/2020 4/16/2020   INR 1.6 2.0 3.0   INR Date 2/21/2020 3/6/2020 4/16/2020   INR Goal 2.0-3.0 2.0-3.0 2.0-3.0   Trend Same Same Same   Last Week Total 32.5 mg 37.5 mg 37.5 mg   Next Week Total 42.5 mg 37.5 mg 37.5 mg   Sun 5 mg 5 mg 5 mg   Mon 5 mg 5 mg 5 mg   Tue 5 mg 5 mg 5 mg   Wed 5 mg 5 mg 5 mg   Thu 5 mg 5 mg 5 mg   Fri 10 mg (2/21); Otherwise 7.5 mg 7.5 mg 7.5 mg   Sat 7.5 mg (2/22); Otherwise 5 mg 5 mg 5 mg   Visit Report - - -    Some recent data might be hidden       Plan:  1. INR is therapeutic today- see above in Anticoagulation Summary.   Will instruct Diego Martinez to continue their warfarin regimen- see above in Anticoagulation Summary.  2. Follow up in 6 weeks.  3. Patient declines warfarin refills.  4. Verbal and written information provided. Patient expresses understanding and has no further questions at this time.    Tabitha Courtney

## 2020-04-17 ENCOUNTER — APPOINTMENT (OUTPATIENT)
Dept: PHARMACY | Facility: HOSPITAL | Age: 75
End: 2020-04-17

## 2020-05-28 ENCOUNTER — ANTICOAGULATION VISIT (OUTPATIENT)
Dept: PHARMACY | Facility: HOSPITAL | Age: 75
End: 2020-05-28

## 2020-05-28 DIAGNOSIS — I48.20 CHRONIC ATRIAL FIBRILLATION (HCC): ICD-10-CM

## 2020-05-28 LAB
INR PPP: 2.8 (ref 0.91–1.09)
PROTHROMBIN TIME: 33.3 SECONDS (ref 10–13.8)

## 2020-05-28 PROCEDURE — 85610 PROTHROMBIN TIME: CPT

## 2020-05-28 PROCEDURE — 36416 COLLJ CAPILLARY BLOOD SPEC: CPT

## 2020-05-28 NOTE — PROGRESS NOTES
Anticoagulation Clinic Progress Note    Anticoagulation Summary  As of 2020    INR goal:   2.0-3.0   TTR:   78.6 % (1.8 y)   INR used for dosin.8 (2020)   Warfarin maintenance plan:   7.5 mg every Fri; 5 mg all other days   Weekly warfarin total:   37.5 mg   No change documented:   Tabitha Courtney   Plan last modified:   Thelma Gonzalez, [facility] (2018)   Next INR check:   2020   Priority:   Maintenance   Target end date:   Indefinite    Indications    Chronic atrial fibrillation [I48.20]             Anticoagulation Episode Summary     INR check location:       Preferred lab:       Send INR reminders to:    [facility]    Comments:         Anticoagulation Care Providers     Provider Role Specialty Phone number    Chuck Conner MD Referring Cardiology [phone]          Clinic Interview:      INR History:  Anticoagulation Monitoring 3/6/2020 2020 2020   INR 2.0 3.0 2.8   INR Date 3/6/2020 2020 2020   INR Goal 2.0-3.0 2.0-3.0 2.0-3.0   Trend Same Same Same   Last Week Total 37.5 mg 37.5 mg 37.5 mg   Next Week Total 37.5 mg 37.5 mg 37.5 mg   Sun 5 mg 5 mg 5 mg   Mon 5 mg 5 mg 5 mg   Tue 5 mg 5 mg 5 mg   Wed 5 mg 5 mg 5 mg   Thu 5 mg 5 mg 5 mg   Fri 7.5 mg 7.5 mg 7.5 mg   Sat 5 mg 5 mg 5 mg   Visit Report - - -   Some recent data might be hidden       Plan:  1. INR is therapeutic today- see above in Anticoagulation Summary.   Will instruct Diego Martinez to continue their warfarin regimen- see above in Anticoagulation Summary.  2. Follow up in 6 weeks.  3. Patient declines warfarin refills.  4. Verbal and written information provided. Patient expresses understanding and has no further questions at this time.    Tabitha Courtney

## 2020-06-15 RX ORDER — METOPROLOL SUCCINATE 25 MG/1
TABLET, EXTENDED RELEASE ORAL
Qty: 30 TABLET | Refills: 0 | Status: SHIPPED | OUTPATIENT
Start: 2020-06-15 | End: 2020-07-28

## 2020-06-26 RX ORDER — METOPROLOL SUCCINATE 25 MG/1
TABLET, EXTENDED RELEASE ORAL
Qty: 30 TABLET | Refills: 0 | Status: SHIPPED | OUTPATIENT
Start: 2020-06-26 | End: 2020-11-18

## 2020-07-27 NOTE — TELEPHONE ENCOUNTER
Please call pt to make a follow up appt.  He was last seen on 2/23/18 and told to follow up in 18 months (Sept 2019)

## 2020-07-28 RX ORDER — METOPROLOL SUCCINATE 25 MG/1
25 TABLET, EXTENDED RELEASE ORAL DAILY
Qty: 30 TABLET | Refills: 0 | Status: SHIPPED | OUTPATIENT
Start: 2020-07-28 | End: 2020-08-21

## 2020-08-05 ENCOUNTER — ANTICOAGULATION VISIT (OUTPATIENT)
Dept: PHARMACY | Facility: HOSPITAL | Age: 75
End: 2020-08-05

## 2020-08-05 DIAGNOSIS — I48.20 CHRONIC ATRIAL FIBRILLATION (HCC): ICD-10-CM

## 2020-08-05 LAB
INR PPP: 2 (ref 0.91–1.09)
PROTHROMBIN TIME: 24.2 SECONDS (ref 10–13.8)

## 2020-08-05 PROCEDURE — 85610 PROTHROMBIN TIME: CPT

## 2020-08-05 PROCEDURE — 36416 COLLJ CAPILLARY BLOOD SPEC: CPT

## 2020-08-05 NOTE — PROGRESS NOTES
I have supervised and reviewed the notes, assessments, and/or procedures performed by our PharmD Candidate.  I concur with the documentation of this patient encounter.    Xiomara Snider [facility]

## 2020-08-05 NOTE — PROGRESS NOTES
Anticoagulation Clinic Progress Note    Anticoagulation Summary  As of 2020    INR goal:   2.0-3.0   TTR:   80.7 % (2 y)   INR used for dosin.0 (2020)   Warfarin maintenance plan:   7.5 mg every Fri; 5 mg all other days   Weekly warfarin total:   37.5 mg   Plan last modified:   Gisele Colbert, Pharmacy Intern (2020)   Next INR check:   2020   Priority:   Maintenance   Target end date:   Indefinite    Indications    Chronic atrial fibrillation (CMS/HCC) [I48.20]             Anticoagulation Episode Summary     INR check location:       Preferred lab:       Send INR reminders to:   [facility]    Comments:         Anticoagulation Care Providers     Provider Role Specialty Phone number    Chuck Conner MD Referring Cardiology [phone]          Clinic Interview:  Patient Findings     Negatives:   Signs/symptoms of thrombosis, Signs/symptoms of bleeding,   Laboratory test error suspected, Change in health, Change in alcohol use,   Change in activity, Upcoming invasive procedure, Emergency department   visit, Upcoming dental procedure, Missed doses, Extra doses, Change in   medications, Change in diet/appetite, Hospital admission, Bruising, Other   complaints      Clinical Outcomes     Negatives:   Major bleeding event, Thromboembolic event,   Anticoagulation-related hospital admission, Anticoagulation-related ED   visit, Anticoagulation-related fatality        INR History:  Anticoagulation Monitoring 2020   INR 3.0 2.8 2.0   INR Date 2020   INR Goal 2.0-3.0 2.0-3.0 2.0-3.0   Trend Same Same Same   Last Week Total 37.5 mg 37.5 mg 37.5 mg   Next Week Total 37.5 mg 37.5 mg 37.5 mg   Sun 5 mg 5 mg 5 mg   Mon 5 mg 5 mg 5 mg   Tue 5 mg 5 mg 5 mg   Wed 5 mg 5 mg 5 mg   Thu 5 mg 5 mg 5 mg   Fri 7.5 mg 7.5 mg 7.5 mg   Sat 5 mg 5 mg 5 mg   Visit Report - - -   Some recent data might be hidden       Plan:  1. INR is Therapeutic today-  see above in Anticoagulation Summary.  Will instruct Diego Martinez to Continue their warfarin regimen- see above in Anticoagulation Summary.  2. Follow up in 6 weeks  3. Patient declines warfarin refills.  4. Verbal and written information provided. Patient expresses understanding and has no further questions at this time.    Gisele Colbert, Pharmacy Intern

## 2020-08-21 RX ORDER — METOPROLOL SUCCINATE 25 MG/1
TABLET, EXTENDED RELEASE ORAL
Qty: 30 TABLET | Refills: 0 | Status: SHIPPED | OUTPATIENT
Start: 2020-08-21 | End: 2020-10-28

## 2020-09-16 RX ORDER — AMLODIPINE BESYLATE 5 MG/1
TABLET ORAL
Qty: 30 TABLET | Refills: 0 | Status: SHIPPED | OUTPATIENT
Start: 2020-09-16 | End: 2020-09-25

## 2020-09-17 RX ORDER — WARFARIN SODIUM 5 MG/1
TABLET ORAL
Qty: 100 TABLET | Refills: 0 | Status: SHIPPED | OUTPATIENT
Start: 2020-09-17 | End: 2020-12-21 | Stop reason: SDUPTHER

## 2020-09-21 ENCOUNTER — ANTICOAGULATION VISIT (OUTPATIENT)
Dept: PHARMACY | Facility: HOSPITAL | Age: 75
End: 2020-09-21

## 2020-09-21 DIAGNOSIS — I48.20 CHRONIC ATRIAL FIBRILLATION (HCC): ICD-10-CM

## 2020-09-21 LAB
INR PPP: 2.5 (ref 0.91–1.09)
PROTHROMBIN TIME: 29.8 SECONDS (ref 10–13.8)

## 2020-09-21 PROCEDURE — 85610 PROTHROMBIN TIME: CPT

## 2020-09-21 PROCEDURE — 36416 COLLJ CAPILLARY BLOOD SPEC: CPT

## 2020-09-21 NOTE — PROGRESS NOTES
Anticoagulation Clinic Progress Note    Anticoagulation Summary  As of 2020    INR goal:  2.0-3.0   TTR:  81.9 % (2.1 y)   INR used for dosin.5 (2020)   Warfarin maintenance plan:  7.5 mg every Fri; 5 mg all other days   Weekly warfarin total:  37.5 mg   Plan last modified:  Gisele Colbert, Pharmacy Intern (2020)   Next INR check:  2020   Priority:  Maintenance   Target end date:  Indefinite    Indications    Chronic atrial fibrillation (CMS/HCC) [I48.20]             Anticoagulation Episode Summary     INR check location:      Preferred lab:      Send INR reminders to:   [facility]    Comments:        Anticoagulation Care Providers     Provider Role Specialty Phone number    Chuck Conner MD Referring Cardiology [phone]          Clinic Interview:      INR History:  Anticoagulation Monitoring 2020   INR 2.8 2.0 2.5   INR Date 2020   INR Goal 2.0-3.0 2.0-3.0 2.0-3.0   Trend Same Same Same   Last Week Total 37.5 mg 37.5 mg 37.5 mg   Next Week Total 37.5 mg 37.5 mg 37.5 mg   Sun 5 mg 5 mg 5 mg   Mon 5 mg 5 mg 5 mg   Tue 5 mg 5 mg 5 mg   Wed 5 mg 5 mg 5 mg   Thu 5 mg 5 mg 5 mg   Fri 7.5 mg 7.5 mg 7.5 mg   Sat 5 mg 5 mg 5 mg   Visit Report - - -   Some recent data might be hidden       Plan:  1. INR is Therapeutic today- see above in Anticoagulation Summary.  Will instruct Diego Martinez to Continue their warfarin regimen- see above in Anticoagulation Summary.  2. Follow up in 6 weeks  3. Patient declines warfarin refills.  4. Verbal and written information provided. Patient expresses understanding and has no further questions at this time.    Renetta Wood [facility]

## 2020-09-25 RX ORDER — METOPROLOL SUCCINATE 25 MG/1
TABLET, EXTENDED RELEASE ORAL
Qty: 30 TABLET | Refills: 0 | Status: SHIPPED | OUTPATIENT
Start: 2020-09-25 | End: 2020-11-18

## 2020-09-25 RX ORDER — AMLODIPINE BESYLATE 5 MG/1
TABLET ORAL
Qty: 30 TABLET | Refills: 0 | Status: SHIPPED | OUTPATIENT
Start: 2020-09-25 | End: 2020-10-28

## 2020-10-28 RX ORDER — METOPROLOL SUCCINATE 25 MG/1
TABLET, EXTENDED RELEASE ORAL
Qty: 30 TABLET | Refills: 0 | Status: SHIPPED | OUTPATIENT
Start: 2020-10-28 | End: 2020-11-18

## 2020-10-28 RX ORDER — AMLODIPINE BESYLATE 5 MG/1
TABLET ORAL
Qty: 30 TABLET | Refills: 0 | Status: SHIPPED | OUTPATIENT
Start: 2020-10-28 | End: 2020-11-18

## 2020-11-04 ENCOUNTER — ANTICOAGULATION VISIT (OUTPATIENT)
Dept: PHARMACY | Facility: HOSPITAL | Age: 75
End: 2020-11-04

## 2020-11-04 DIAGNOSIS — I48.20 CHRONIC ATRIAL FIBRILLATION (HCC): ICD-10-CM

## 2020-11-04 LAB
INR PPP: 2 (ref 0.91–1.09)
PROTHROMBIN TIME: 24.5 SECONDS (ref 10–13.8)

## 2020-11-04 PROCEDURE — 36416 COLLJ CAPILLARY BLOOD SPEC: CPT

## 2020-11-04 PROCEDURE — 85610 PROTHROMBIN TIME: CPT

## 2020-11-04 NOTE — PROGRESS NOTES
Anticoagulation Clinic Progress Note    Anticoagulation Summary  As of 2020    INR goal:  2.0-3.0   TTR:  82.9 % (2.2 y)   INR used for dosin.0 (2020)   Warfarin maintenance plan:  7.5 mg every Fri; 5 mg all other days   Weekly warfarin total:  37.5 mg   No change documented:  Joana Hirsch   Plan last modified:  Gisele Colbert, Pharmacy Intern (2020)   Next INR check:  2020   Priority:  Maintenance   Target end date:  Indefinite    Indications    Chronic atrial fibrillation (CMS/[facility]) [I48.20]             Anticoagulation Episode Summary     INR check location:      Preferred lab:      Send INR reminders to:   [facility]    Comments:        Anticoagulation Care Providers     Provider Role Specialty Phone number    Chuck Conner MD Referring Cardiology [phone]          Clinic Interview:  Patient Findings     Negatives:  Signs/symptoms of thrombosis, Signs/symptoms of bleeding,   Laboratory test error suspected, Change in health, Change in alcohol use,   Change in activity, Upcoming invasive procedure, Emergency department   visit, Upcoming dental procedure, Missed doses, Extra doses, Change in   medications, Change in diet/appetite, Hospital admission, Bruising, Other   complaints      Clinical Outcomes     Negatives:  Major bleeding event, Thromboembolic event,   Anticoagulation-related hospital admission, Anticoagulation-related ED   visit, Anticoagulation-related fatality        INR History:  Anticoagulation Monitoring 2020   INR 2.0 2.5 2.0   INR Date 2020   INR Goal 2.0-3.0 2.0-3.0 2.0-3.0   Trend Same Same Same   Last Week Total 37.5 mg 37.5 mg 37.5 mg   Next Week Total 37.5 mg 37.5 mg 37.5 mg   Sun 5 mg 5 mg 5 mg   Mon 5 mg 5 mg 5 mg   Tue 5 mg 5 mg 5 mg   Wed 5 mg 5 mg 5 mg   Thu 5 mg 5 mg 5 mg   Fri 7.5 mg 7.5 mg 7.5 mg   Sat 5 mg 5 mg 5 mg   Visit Report - - -   Some recent data might be hidden        Plan:  1. INR is therapeutic today- see above in Anticoagulation Summary.   Will instruct Diego Martinez to continue their warfarin regimen- see above in Anticoagulation Summary.  2. Follow up in 6 weeks.  3. Patient declines warfarin refills.  4. Verbal and written information provided. Patient expresses understanding and has no further questions at this time.    Joana Hirsch

## 2020-11-18 RX ORDER — AMLODIPINE BESYLATE 5 MG/1
TABLET ORAL
Qty: 90 TABLET | Refills: 0 | Status: SHIPPED | OUTPATIENT
Start: 2020-11-18 | End: 2021-01-25

## 2020-11-18 RX ORDER — METOPROLOL SUCCINATE 25 MG/1
TABLET, EXTENDED RELEASE ORAL
Qty: 90 TABLET | Refills: 0 | Status: SHIPPED | OUTPATIENT
Start: 2020-11-18 | End: 2020-12-18

## 2020-12-18 RX ORDER — METOPROLOL SUCCINATE 25 MG/1
TABLET, EXTENDED RELEASE ORAL
Qty: 90 TABLET | Refills: 0 | Status: SHIPPED | OUTPATIENT
Start: 2020-12-18 | End: 2021-04-20

## 2020-12-21 ENCOUNTER — ANTICOAGULATION VISIT (OUTPATIENT)
Dept: PHARMACY | Facility: HOSPITAL | Age: 75
End: 2020-12-21

## 2020-12-21 DIAGNOSIS — I48.20 CHRONIC ATRIAL FIBRILLATION (HCC): ICD-10-CM

## 2020-12-21 LAB
INR PPP: 2.3 (ref 0.91–1.09)
PROTHROMBIN TIME: 27.8 SECONDS (ref 10–13.8)

## 2020-12-21 PROCEDURE — 36416 COLLJ CAPILLARY BLOOD SPEC: CPT

## 2020-12-21 PROCEDURE — 85610 PROTHROMBIN TIME: CPT

## 2020-12-21 RX ORDER — WARFARIN SODIUM 5 MG/1
TABLET ORAL
Qty: 100 TABLET | Refills: 0 | Status: SHIPPED | OUTPATIENT
Start: 2020-12-21 | End: 2021-02-24 | Stop reason: SDUPTHER

## 2020-12-21 NOTE — PROGRESS NOTES
Anticoagulation Clinic Progress Note    Anticoagulation Summary  As of 2020    INR goal:  2.0-3.0   TTR:  83.8 % (2.3 y)   INR used for dosin.3 (2020)   Warfarin maintenance plan:  7.5 mg every Fri; 5 mg all other days   Weekly warfarin total:  37.5 mg   No change documented:  Lilly Burger   Plan last modified:  Gisele Colbert, Pharmacy Intern (2020)   Next INR check:  2021   Priority:  Maintenance   Target end date:  Indefinite    Indications    Chronic atrial fibrillation (CMS/[facility]) [I48.20]             Anticoagulation Episode Summary     INR check location:      Preferred lab:      Send INR reminders to:  [facility]    Comments:        Anticoagulation Care Providers     Provider Role Specialty Phone number    Chuck Conner MD Referring Cardiology [phone]          Clinic Interview:  Patient Findings     Negatives:  Signs/symptoms of thrombosis, Signs/symptoms of bleeding,   Laboratory test error suspected, Change in health, Change in alcohol use,   Change in activity, Upcoming invasive procedure, Emergency department   visit, Upcoming dental procedure, Missed doses, Extra doses, Change in   medications, Change in diet/appetite, Hospital admission, Bruising, Other   complaints      Clinical Outcomes     Negatives:  Major bleeding event, Thromboembolic event,   Anticoagulation-related hospital admission, Anticoagulation-related ED   visit, Anticoagulation-related fatality        INR History:  Anticoagulation Monitoring 2020   INR 2.5 2.0 2.3   INR Date 2020   INR Goal 2.0-3.0 2.0-3.0 2.0-3.0   Trend Same Same Same   Last Week Total 37.5 mg 37.5 mg 37.5 mg   Next Week Total 37.5 mg 37.5 mg 37.5 mg   Sun 5 mg 5 mg 5 mg   Mon 5 mg 5 mg 5 mg   Tue 5 mg 5 mg 5 mg   Wed 5 mg 5 mg 5 mg   Thu 5 mg 5 mg 5 mg   Fri 7.5 mg 7.5 mg 7.5 mg   Sat 5 mg 5 mg 5 mg   Visit Report - - -   Some recent data might be  hidden       Plan:  1. INR is therapeutic today- see above in Anticoagulation Summary.   Will instruct Diego Martinez to continue their warfarin regimen- see above in Anticoagulation Summary.  2. Follow up in 8 weeks.  3. Patient desires warfarin refills.  4. Verbal and written information provided. Patient expresses understanding and has no further questions at this time.    Lilly Burger

## 2021-01-22 ENCOUNTER — OFFICE VISIT (OUTPATIENT)
Dept: CARDIOLOGY | Facility: CLINIC | Age: 76
End: 2021-01-22

## 2021-01-22 VITALS
HEART RATE: 41 BPM | WEIGHT: 215 LBS | OXYGEN SATURATION: 98 % | HEIGHT: 70 IN | RESPIRATION RATE: 16 BRPM | DIASTOLIC BLOOD PRESSURE: 84 MMHG | BODY MASS INDEX: 30.78 KG/M2 | SYSTOLIC BLOOD PRESSURE: 138 MMHG

## 2021-01-22 DIAGNOSIS — I10 ESSENTIAL HYPERTENSION: ICD-10-CM

## 2021-01-22 DIAGNOSIS — E66.09 NON MORBID OBESITY DUE TO EXCESS CALORIES: ICD-10-CM

## 2021-01-22 DIAGNOSIS — I48.20 CHRONIC ATRIAL FIBRILLATION (HCC): ICD-10-CM

## 2021-01-22 PROCEDURE — 93000 ELECTROCARDIOGRAM COMPLETE: CPT | Performed by: NURSE PRACTITIONER

## 2021-01-22 PROCEDURE — 99213 OFFICE O/P EST LOW 20 MIN: CPT | Performed by: NURSE PRACTITIONER

## 2021-01-22 RX ORDER — MULTIPLE VITAMINS W/ MINERALS TAB 9MG-400MCG
1 TAB ORAL DAILY
COMMUNITY

## 2021-01-22 NOTE — PROGRESS NOTES
Date of Office Visit: 2021  Encounter Provider: TJ Carranza  Place of Service: [facility] CARDIOLOGY  Patient Name: Diego Martinez  :1945    Chief Complaint   Patient presents with   • Atrial Fibrillation   :     HPI: Diego Martinez is a 75 y.o. male who is a patient followed by Dr. Conner. Hx of perm afib, HTN, PITO and obesity.     Presents today for routine follow-up.    He was last seen in the office in 2018.    He is on warfarin for AC.  No bleeding issues.    He reports that he is doing well.  He denies chest pain, dyspnea, PND, orthopnea, dizziness or syncope.  Some mild lower extremity edema that he uses compression stockings for.         Past Medical History:   Diagnosis Date   • Atrial fibrillation (CMS/HCC)    • Chronic atrial fibrillation (CMS/HCC)    • Colon cancer (CMS/HCC)    • Essential hypertension    • Non morbid obesity due to excess calories    • PITO on CPAP        History reviewed. No pertinent surgical history.    Social History     Socioeconomic History   • Marital status: Single     Spouse name: Not on file   • Number of children: Not on file   • Years of education: Not on file   • Highest education level: Not on file   Tobacco Use   • Smoking status: Former Smoker   Substance and Sexual Activity   • Alcohol use: No   • Drug use: No       History reviewed. No pertinent family history.    Review of Systems   Constitution: Negative for chills, fever and malaise/fatigue.   Cardiovascular: Negative for chest pain, dyspnea on exertion, leg swelling, near-syncope, orthopnea, palpitations, paroxysmal nocturnal dyspnea and syncope.   Respiratory: Negative for cough and shortness of breath.    Hematologic/Lymphatic: Negative.    Musculoskeletal: Negative for joint pain, joint swelling and myalgias.   Gastrointestinal: Negative for abdominal pain, diarrhea, melena, nausea and vomiting.   Genitourinary: Negative for frequency  "and hematuria.   Neurological: Negative for light-headedness, numbness, paresthesias and seizures.   Allergic/Immunologic: Negative.    All other systems reviewed and are negative.      No Known Allergies      Current Outpatient Medications:   •  amLODIPine (NORVASC) 5 MG tablet, TAKE 1 TABLET BY MOUTH EVERY DAY , Disp: 90 tablet, Rfl: 0  •  Jantoven 5 MG tablet, Take 1 5 tablets (7.5mg) by mouth on Friday and 1 tablet (5mg) all other days OR as directed by the [facility]., Disp: 100 tablet, Rfl: 0  •  metoprolol succinate XL (TOPROL-XL) 25 MG 24 hr tablet, TAKE 1 TABLET BY MOUTH EVERY DAY , Disp: 90 tablet, Rfl: 0  •  multivitamin with minerals (MULTIVITAMIN ADULT PO), Take 1 tablet by mouth Daily., Disp: , Rfl:   •  omeprazole (priLOSEC) 20 MG capsule, Take 20 mg by mouth Daily., Disp: , Rfl:       Objective:     Vitals:    01/22/21 1142   BP: 138/84   Pulse: (!) 41   Resp: 16   SpO2: 98%   Weight: 97.5 kg (215 lb)   Height: 177.8 cm (70\")     Body mass index is 30.85 kg/m².    PHYSICAL EXAM:    Vitals Reviewed.   General Appearance: No acute distress, well developed and well nourished.   Eyes: Conjunctiva and lids: No erythema, swelling, or discharge. Sclera non-icteric.   HENT: Atraumatic, normocephalic. External eyes, ears, and nose normal.   Respiratory: No signs of respiratory distress. Respiration rhythm and depth normal.   Clear to auscultation. No rales, crackles, rhonchi, or wheezing auscultated.   Cardiovascular:  Heart Rate and Rhythm: Irregularly, irregular. Heart Sounds: S1 and S2.   Murmurs: No murmurs noted. No rubs, thrills, or gallops.   Arterial Pulses:  Posterior tibialis and dorsalis pedis pulses normal.   Lower Extremities: No edema noted.  Gastrointestinal:  Abdomen soft, non-distended, non-tender.   Musculoskeletal: Normal movement of extremities  Skin and Nails: General appearance normal. No pallor, cyanosis, diaphoresis. Skin temperature normal. No clubbing of fingernails. "   Psychiatric: Patient alert and oriented to person, place, and time. Speech and behavior appropriate. Normal mood and affect.       ECG 12 Lead    Date/Time: 1/22/2021 11:31 AM  Performed by: Rosemarie Juarez APRN  Authorized by: Rosemarie Juarez APRN   Comparison: compared with previous ECG   Similar to previous ECG  Rhythm: atrial fibrillation  BPM: 41                Assessment:       Diagnosis Plan   1. Chronic atrial fibrillation (CMS/HCC)  ECG 12 Lead   2. Essential hypertension     3. Non morbid obesity due to excess calories            Plan:       1. Perm afib--heart rate well controlled on low-dose beta-blocker.  He was in the 40s today but he he feels good with no problems with dizziness, near syncope or syncope so I am not going to make any changes.  Warfarin for anticoagulation.    2. HTN, controlled.    3. For the problem of overweight/obesity, we discussed the importance of lifestyle measures and strategies for weight loss, such as improved nutrition, regular exercise and sleep hygiene.      Follow-up with Dr. Conner and 18 months or sooner should the need arise.    As always, it has been a pleasure to participate in your patient's care.      Sincerely,         TJ Anderson

## 2021-01-25 RX ORDER — AMLODIPINE BESYLATE 5 MG/1
TABLET ORAL
Qty: 30 TABLET | Refills: 0 | Status: SHIPPED | OUTPATIENT
Start: 2021-01-25 | End: 2021-02-26

## 2021-01-25 NOTE — TELEPHONE ENCOUNTER
Norvasc refill requested   Last office visit 1/22/2021  Last labs 12/20/2019 (cmp, tsh, lipid)   PT/INR 12/21/2020  Next office visit 1/22/2021

## 2021-02-24 ENCOUNTER — ANTICOAGULATION VISIT (OUTPATIENT)
Dept: PHARMACY | Facility: HOSPITAL | Age: 76
End: 2021-02-24

## 2021-02-24 DIAGNOSIS — I48.20 CHRONIC ATRIAL FIBRILLATION (HCC): ICD-10-CM

## 2021-02-24 LAB
INR PPP: 1.6 (ref 0.91–1.09)
PROTHROMBIN TIME: 18.9 SECONDS (ref 10–13.8)

## 2021-02-24 PROCEDURE — 36416 COLLJ CAPILLARY BLOOD SPEC: CPT

## 2021-02-24 PROCEDURE — 85610 PROTHROMBIN TIME: CPT

## 2021-02-24 RX ORDER — WARFARIN SODIUM 5 MG/1
TABLET ORAL
Qty: 100 TABLET | Refills: 1 | Status: SHIPPED | OUTPATIENT
Start: 2021-02-24 | End: 2021-04-26 | Stop reason: SDUPTHER

## 2021-02-24 NOTE — PROGRESS NOTES
Anticoagulation Clinic Progress Note    Anticoagulation Summary  As of 2021    INR goal:  2.0-3.0   TTR:  80.9 % (2.5 y)   INR used for dosin.6 (2021)   Warfarin maintenance plan:  7.5 mg every Fri; 5 mg all other days   Weekly warfarin total:  37.5 mg   Plan last modified:  Gisele Colbert, Pharmacy Intern (2020)   Next INR check:  3/17/2021   Priority:  Maintenance   Target end date:  Indefinite    Indications    Chronic atrial fibrillation (CMS/HCC) [I48.20]             Anticoagulation Episode Summary     INR check location:      Preferred lab:      Send INR reminders to:   [facility]    Comments:        Anticoagulation Care Providers     Provider Role Specialty Phone number    Chuck Conner MD Referring Cardiology [phone]          Clinic Interview:  Patient Findings     Negatives:  Signs/symptoms of thrombosis, Signs/symptoms of bleeding,   Laboratory test error suspected, Change in health, Change in alcohol use,   Change in activity, Upcoming invasive procedure, Emergency department   visit, Upcoming dental procedure, Missed doses, Extra doses, Change in   medications, Change in diet/appetite, Hospital admission, Bruising, Other   complaints      Clinical Outcomes     Negatives:  Major bleeding event, Thromboembolic event,   Anticoagulation-related hospital admission, Anticoagulation-related ED   visit, Anticoagulation-related fatality        INR History:  Anticoagulation Monitoring 2020   INR 2.0 2.3 1.6   INR Date 2020   INR Goal 2.0-3.0 2.0-3.0 2.0-3.0   Trend Same Same Same   Last Week Total 37.5 mg 37.5 mg 37.5 mg   Next Week Total 37.5 mg 37.5 mg 42.5 mg   Sun 5 mg 5 mg 5 mg   Mon 5 mg 5 mg 5 mg   Tue 5 mg 5 mg 5 mg   Wed 5 mg 5 mg 10 mg (); Otherwise 5 mg   Thu 5 mg 5 mg 5 mg   Fri 7.5 mg 7.5 mg 7.5 mg   Sat 5 mg 5 mg 5 mg   Visit Report - - -   Some recent data might be hidden       Plan:  1. INR is out  of range- see above in Anticoagulation Summary.   Will instruct Diego Juan to Change  their warfarin regimen- see above in Anticoagulation Summary.  2. Follow up in 3 weeks.   3. Patient desires warfarin refills.  4. Verbal and written information provided. Patient expresses understanding and has no further questions at this time.    Joana Hirsch

## 2021-02-26 RX ORDER — AMLODIPINE BESYLATE 5 MG/1
TABLET ORAL
Qty: 90 TABLET | Refills: 0 | Status: SHIPPED | OUTPATIENT
Start: 2021-02-26 | End: 2021-06-24

## 2021-03-23 ENCOUNTER — TELEPHONE (OUTPATIENT)
Dept: PHARMACY | Facility: HOSPITAL | Age: 76
End: 2021-03-23

## 2021-03-24 ENCOUNTER — ANTICOAGULATION VISIT (OUTPATIENT)
Dept: PHARMACY | Facility: HOSPITAL | Age: 76
End: 2021-03-24

## 2021-03-24 DIAGNOSIS — I48.20 CHRONIC ATRIAL FIBRILLATION (HCC): ICD-10-CM

## 2021-03-24 LAB
INR PPP: 2 (ref 0.91–1.09)
PROTHROMBIN TIME: 23.8 SECONDS (ref 10–13.8)

## 2021-03-24 PROCEDURE — 36416 COLLJ CAPILLARY BLOOD SPEC: CPT

## 2021-03-24 PROCEDURE — 85610 PROTHROMBIN TIME: CPT

## 2021-03-24 NOTE — PROGRESS NOTES
Anticoagulation Clinic Progress Note    Anticoagulation Summary  As of 3/24/2021    INR goal:  2.0-3.0   TTR:  78.5 % (2.6 y)   INR used for dosin.0 (3/24/2021)   Warfarin maintenance plan:  7.5 mg every Fri; 5 mg all other days   Weekly warfarin total:  37.5 mg   No change documented:  Joana Hirsch   Plan last modified:  Gisele Colbert, Pharmacy Intern (2020)   Next INR check:  2021   Priority:  Maintenance   Target end date:  Indefinite    Indications    Chronic atrial fibrillation (CMS/[facility]) [I48.20]             Anticoagulation Episode Summary     INR check location:      Preferred lab:      Send INR reminders to:  [facility]    Comments:        Anticoagulation Care Providers     Provider Role Specialty Phone number    Chuck Conner MD Referring Cardiology [phone]          Clinic Interview:  Patient Findings     Negatives:  Signs/symptoms of thrombosis, Signs/symptoms of bleeding,   Laboratory test error suspected, Change in health, Change in alcohol use,   Change in activity, Upcoming invasive procedure, Emergency department   visit, Upcoming dental procedure, Missed doses, Extra doses, Change in   medications, Change in diet/appetite, Hospital admission, Bruising, Other   complaints      Clinical Outcomes     Negatives:  Major bleeding event, Thromboembolic event,   Anticoagulation-related hospital admission, Anticoagulation-related ED   visit, Anticoagulation-related fatality        INR History:  Anticoagulation Monitoring 2020 2021 3/24/2021   INR 2.3 1.6 2.0   INR Date 2020 2021 3/24/2021   INR Goal 2.0-3.0 2.0-3.0 2.0-3.0   Trend Same Same Same   Last Week Total 37.5 mg 37.5 mg 37.5 mg   Next Week Total 37.5 mg 42.5 mg 37.5 mg   Sun 5 mg 5 mg 5 mg   Mon 5 mg 5 mg 5 mg   Tue 5 mg 5 mg 5 mg   Wed 5 mg 10 mg (); Otherwise 5 mg 5 mg   Thu 5 mg 5 mg 5 mg   Fri 7.5 mg 7.5 mg 7.5 mg   Sat 5 mg 5 mg 5 mg   Visit Report - - -   Some recent data  might be hidden       Plan:  1. INR is therapeutic today- see above in Anticoagulation Summary.   Will instruct Diego Martinez to continue their warfarin regimen- see above in Anticoagulation Summary.  2. Follow up in 4 weeks.  3. Patient declines warfarin refills.  4. Verbal and written information provided. Patient expresses understanding and has no further questions at this time.    Joana Hirsch

## 2021-03-25 ENCOUNTER — IMMUNIZATION (OUTPATIENT)
Dept: VACCINE CLINIC | Facility: HOSPITAL | Age: 76
End: 2021-03-25

## 2021-03-25 PROCEDURE — 91300 HC SARSCOV02 VAC 30MCG/0.3ML IM: CPT | Performed by: INTERNAL MEDICINE

## 2021-03-25 PROCEDURE — 0001A: CPT | Performed by: INTERNAL MEDICINE

## 2021-04-15 ENCOUNTER — IMMUNIZATION (OUTPATIENT)
Dept: VACCINE CLINIC | Facility: HOSPITAL | Age: 76
End: 2021-04-15

## 2021-04-15 PROCEDURE — 0002A: CPT | Performed by: INTERNAL MEDICINE

## 2021-04-15 PROCEDURE — 91300 HC SARSCOV02 VAC 30MCG/0.3ML IM: CPT | Performed by: INTERNAL MEDICINE

## 2021-04-20 RX ORDER — METOPROLOL SUCCINATE 25 MG/1
TABLET, EXTENDED RELEASE ORAL
Qty: 90 TABLET | Refills: 4 | Status: SHIPPED | OUTPATIENT
Start: 2021-04-20 | End: 2022-05-05

## 2021-04-26 ENCOUNTER — ANTICOAGULATION VISIT (OUTPATIENT)
Dept: PHARMACY | Facility: HOSPITAL | Age: 76
End: 2021-04-26

## 2021-04-26 DIAGNOSIS — I48.20 CHRONIC ATRIAL FIBRILLATION (HCC): Primary | ICD-10-CM

## 2021-04-26 LAB
INR PPP: 2.2 (ref 0.91–1.09)
PROTHROMBIN TIME: 26.3 SECONDS (ref 10–13.8)

## 2021-04-26 PROCEDURE — 36416 COLLJ CAPILLARY BLOOD SPEC: CPT

## 2021-04-26 PROCEDURE — 85610 PROTHROMBIN TIME: CPT

## 2021-04-26 RX ORDER — WARFARIN SODIUM 5 MG/1
TABLET ORAL
Qty: 100 TABLET | Refills: 1 | Status: SHIPPED | OUTPATIENT
Start: 2021-04-26 | End: 2021-10-11 | Stop reason: SDUPTHER

## 2021-04-26 NOTE — PROGRESS NOTES
Anticoagulation Clinic Progress Note    Anticoagulation Summary  As of 2021    INR goal:  2.0-3.0   TTR:  79.2 % (2.7 y)   INR used for dosin.2 (2021)   Warfarin maintenance plan:  7.5 mg every Fri; 5 mg all other days   Weekly warfarin total:  37.5 mg   No change documented:  Tabitha Courtney   Plan last modified:  Gisele Colbert, Pharmacy Intern (2020)   Next INR check:  2021   Priority:  Maintenance   Target end date:  Indefinite    Indications    Chronic atrial fibrillation (CMS/HCC) [I48.20]             Anticoagulation Episode Summary     INR check location:      Preferred lab:      Send INR reminders to:   [facility]    Comments:        Anticoagulation Care Providers     Provider Role Specialty Phone number    Chuck Conner MD Referring Cardiology [phone]          Clinic Interview:      INR History:  Anticoagulation Monitoring 2021 3/24/2021 2021   INR 1.6 2.0 2.2   INR Date 2021 3/24/2021 2021   INR Goal 2.0-3.0 2.0-3.0 2.0-3.0   Trend Same Same Same   Last Week Total 37.5 mg 37.5 mg 37.5 mg   Next Week Total 42.5 mg 37.5 mg 37.5 mg   Sun 5 mg 5 mg 5 mg   Mon 5 mg 5 mg 5 mg   Tue 5 mg 5 mg 5 mg   Wed 10 mg (); Otherwise 5 mg 5 mg 5 mg   Thu 5 mg 5 mg 5 mg   Fri 7.5 mg 7.5 mg 7.5 mg   Sat 5 mg 5 mg 5 mg   Visit Report - - -   Some recent data might be hidden       Plan:  1. INR is therapeutic today- see above in Anticoagulation Summary.   Will instruct Diego Martinez to continue their warfarin regimen- see above in Anticoagulation Summary.  2. Follow up in 4 weeks.  3. Patient declines warfarin refills.  4. Verbal and written information provided. Patient expresses understanding and has no further questions at this time.    Tabitha Courtney

## 2021-06-01 ENCOUNTER — ANTICOAGULATION VISIT (OUTPATIENT)
Dept: PHARMACY | Facility: HOSPITAL | Age: 76
End: 2021-06-01

## 2021-06-01 DIAGNOSIS — I48.20 CHRONIC ATRIAL FIBRILLATION (HCC): Primary | ICD-10-CM

## 2021-06-01 LAB
INR PPP: 1.9 (ref 0.91–1.09)
PROTHROMBIN TIME: 22.6 SECONDS (ref 10–13.8)

## 2021-06-01 PROCEDURE — 36416 COLLJ CAPILLARY BLOOD SPEC: CPT

## 2021-06-01 PROCEDURE — 85610 PROTHROMBIN TIME: CPT

## 2021-06-01 NOTE — PROGRESS NOTES
Anticoagulation Clinic Progress Note    Anticoagulation Summary  As of 2021    INR goal:  2.0-3.0   TTR:  78.8 % (2.8 y)   INR used for dosin.9 (2021)   Warfarin maintenance plan:  7.5 mg every Fri; 5 mg all other days   Weekly warfarin total:  37.5 mg   No change documented:  Tabitha Courtney   Plan last modified:  Gisele Colbert, Pharmacy Intern (2020)   Next INR check:  2021   Priority:  Maintenance   Target end date:  Indefinite    Indications    Chronic atrial fibrillation (CMS/HCC) [I48.20]             Anticoagulation Episode Summary     INR check location:      Preferred lab:      Send INR reminders to:   [facility] [place]    Comments:        Anticoagulation Care Providers     Provider Role Specialty Phone number    Chuck Conner MD Referring Cardiology [phone]          Clinic Interview:      INR History:  Anticoagulation Monitoring 3/24/2021 2021 2021   INR 2.0 2.2 1.9   INR Date 3/24/2021 2021 2021   INR Goal 2.0-3.0 2.0-3.0 2.0-3.0   Trend Same Same Same   Last Week Total 37.5 mg 37.5 mg 37.5 mg   Next Week Total 37.5 mg 37.5 mg 37.5 mg   Sun 5 mg 5 mg 5 mg   Mon 5 mg 5 mg 5 mg   Tue 5 mg 5 mg 5 mg   Wed 5 mg 5 mg 5 mg   Thu 5 mg 5 mg 5 mg   Fri 7.5 mg 7.5 mg 7.5 mg   Sat 5 mg 5 mg 5 mg   Visit Report - - -   Some recent data might be hidden       Plan:  1. INR is subtherapeutic today- see above in Anticoagulation Summary.   Will instruct Diego Martinez to continue their warfarin regimen- see above in Anticoagulation Summary.  2. Follow up in 4 weeks.  3. Patient declines warfarin refills.  4. Verbal and written information provided. Patient expresses understanding and has no further questions at this time.    Tabitha Courtney

## 2021-06-24 RX ORDER — AMLODIPINE BESYLATE 5 MG/1
TABLET ORAL
Qty: 90 TABLET | Refills: 0 | Status: SHIPPED | OUTPATIENT
Start: 2021-06-24 | End: 2021-07-23

## 2021-06-30 ENCOUNTER — APPOINTMENT (OUTPATIENT)
Dept: PHARMACY | Facility: HOSPITAL | Age: 76
End: 2021-06-30

## 2021-07-01 ENCOUNTER — ANTICOAGULATION VISIT (OUTPATIENT)
Dept: PHARMACY | Facility: HOSPITAL | Age: 76
End: 2021-07-01

## 2021-07-01 DIAGNOSIS — I48.20 CHRONIC ATRIAL FIBRILLATION (HCC): Primary | ICD-10-CM

## 2021-07-01 LAB
INR PPP: 2.4 (ref 0.91–1.09)
PROTHROMBIN TIME: 28.8 SECONDS (ref 10–13.8)

## 2021-07-01 PROCEDURE — 85610 PROTHROMBIN TIME: CPT

## 2021-07-01 PROCEDURE — 36416 COLLJ CAPILLARY BLOOD SPEC: CPT

## 2021-07-01 NOTE — PROGRESS NOTES
Anticoagulation Clinic Progress Note    Anticoagulation Summary  As of 2021    INR goal:  2.0-3.0   TTR:  78.8 % (2.9 y)   INR used for dosin.4 (2021)   Warfarin maintenance plan:  7.5 mg every Fri; 5 mg all other days   Weekly warfarin total:  37.5 mg   No change documented:  Tabitha Courtney   Plan last modified:  Gisele Colbert, Pharmacy Intern (2020)   Next INR check:  2021   Priority:  Maintenance   Target end date:  Indefinite    Indications    Chronic atrial fibrillation (CMS/HCC) [I48.20]             Anticoagulation Episode Summary     INR check location:      Preferred lab:      Send INR reminders to:   [facility]    Comments:        Anticoagulation Care Providers     Provider Role Specialty Phone number    Chuck Conner MD Referring Cardiology [phone]          Clinic Interview:      INR History:  Anticoagulation Monitoring 2021   INR 2.2 1.9 2.4   INR Date 2021   INR Goal 2.0-3.0 2.0-3.0 2.0-3.0   Trend Same Same Same   Last Week Total 37.5 mg 37.5 mg 37.5 mg   Next Week Total 37.5 mg 37.5 mg 37.5 mg   Sun 5 mg 5 mg 5 mg   Mon 5 mg 5 mg 5 mg   Tue 5 mg 5 mg 5 mg   Wed 5 mg 5 mg 5 mg   Thu 5 mg 5 mg 5 mg   Fri 7.5 mg 7.5 mg 7.5 mg   Sat 5 mg 5 mg 5 mg   Visit Report - - -   Some recent data might be hidden       Plan:  1. INR is therapeutic today- see above in Anticoagulation Summary.   Will instruct Diego Martinez to continue their warfarin regimen- see above in Anticoagulation Summary.  2. Follow up in 4 weeks.  3. Patient declines warfarin refills.  4. Verbal and written information provided. Patient expresses understanding and has no further questions at this time.    Tabitha Courtney

## 2021-07-02 RX ORDER — AMLODIPINE BESYLATE 5 MG/1
TABLET ORAL
Qty: 90 TABLET | Refills: 0 | OUTPATIENT
Start: 2021-07-02

## 2021-07-23 RX ORDER — AMLODIPINE BESYLATE 5 MG/1
TABLET ORAL
Qty: 90 TABLET | Refills: 0 | Status: SHIPPED | OUTPATIENT
Start: 2021-07-23 | End: 2021-11-29

## 2021-08-02 ENCOUNTER — ANTICOAGULATION VISIT (OUTPATIENT)
Dept: PHARMACY | Facility: HOSPITAL | Age: 76
End: 2021-08-02

## 2021-08-02 DIAGNOSIS — I48.20 CHRONIC ATRIAL FIBRILLATION (HCC): Primary | ICD-10-CM

## 2021-08-02 LAB
INR PPP: 2.2 (ref 0.91–1.09)
PROTHROMBIN TIME: 25.8 SECONDS (ref 10–13.8)

## 2021-08-02 PROCEDURE — 36416 COLLJ CAPILLARY BLOOD SPEC: CPT

## 2021-08-02 PROCEDURE — 85610 PROTHROMBIN TIME: CPT

## 2021-08-02 NOTE — PROGRESS NOTES
Anticoagulation Clinic Progress Note    Anticoagulation Summary  As of 2021    INR goal:  2.0-3.0   TTR:  79.4 % (2.9 y)   INR used for dosin.2 (2021)   Warfarin maintenance plan:  7.5 mg every Fri; 5 mg all other days   Weekly warfarin total:  37.5 mg   No change documented:  Lilly Burger   Plan last modified:  Gisele Colbert, Pharmacy Intern (2020)   Next INR check:  2021   Priority:  Maintenance   Target end date:  Indefinite    Indications    Chronic atrial fibrillation (CMS/[facility]) [I48.20]             Anticoagulation Episode Summary     INR check location:      Preferred lab:      Send INR reminders to:  [facility]    Comments:        Anticoagulation Care Providers     Provider Role Specialty Phone number    Chuck Conner MD Referring Cardiology [phone]          Clinic Interview:  Patient Findings     Negatives:  Signs/symptoms of thrombosis, Signs/symptoms of bleeding,   Laboratory test error suspected, Change in health, Change in alcohol use,   Change in activity, Upcoming invasive procedure, Emergency department   visit, Upcoming dental procedure, Missed doses, Extra doses, Change in   medications, Change in diet/appetite, Hospital admission, Bruising, Other   complaints      Clinical Outcomes     Negatives:  Major bleeding event, Thromboembolic event,   Anticoagulation-related hospital admission, Anticoagulation-related ED   visit, Anticoagulation-related fatality        INR History:  Anticoagulation Monitoring 2021   INR 1.9 2.4 2.2   INR Date 2021   INR Goal 2.0-3.0 2.0-3.0 2.0-3.0   Trend Same Same Same   Last Week Total 37.5 mg 37.5 mg 37.5 mg   Next Week Total 37.5 mg 37.5 mg 37.5 mg   Sun 5 mg 5 mg 5 mg   Mon 5 mg 5 mg 5 mg   Tue 5 mg 5 mg 5 mg   Wed 5 mg 5 mg 5 mg   Thu 5 mg 5 mg 5 mg   Fri 7.5 mg 7.5 mg 7.5 mg   Sat 5 mg 5 mg 5 mg   Visit Report - - -   Some recent data might be hidden        Plan:  1. INR is therapeutic today- see above in Anticoagulation Summary.   Will instruct Diego Martinez to continue their warfarin regimen- see above in Anticoagulation Summary.  2. Follow up in 4 weeks.  3. Patient declines warfarin refills.  4. Verbal and written information provided. Patient expresses understanding and has no further questions at this time.    Lilly Burger

## 2021-09-08 ENCOUNTER — ANTICOAGULATION VISIT (OUTPATIENT)
Dept: PHARMACY | Facility: HOSPITAL | Age: 76
End: 2021-09-08

## 2021-09-08 DIAGNOSIS — I48.20 CHRONIC ATRIAL FIBRILLATION (HCC): Primary | ICD-10-CM

## 2021-09-08 LAB
INR PPP: 2.4 (ref 0.91–1.09)
PROTHROMBIN TIME: 29.1 SECONDS (ref 10–13.8)

## 2021-09-08 PROCEDURE — 85610 PROTHROMBIN TIME: CPT

## 2021-09-08 PROCEDURE — 36416 COLLJ CAPILLARY BLOOD SPEC: CPT

## 2021-09-08 NOTE — PROGRESS NOTES
Anticoagulation Clinic Progress Note    Anticoagulation Summary  As of 2021    INR goal:  2.0-3.0   TTR:  80.1 % (3 y)   INR used for dosin.4 (2021)   Warfarin maintenance plan:  7.5 mg every Fri; 5 mg all other days   Weekly warfarin total:  37.5 mg   No change documented:  Lilly Burger   Plan last modified:  Gisele Colbert, Pharmacy Intern (2020)   Next INR check:  10/6/2021   Priority:  Maintenance   Target end date:  Indefinite    Indications    Chronic atrial fibrillation (CMS/[facility]) [I48.20]             Anticoagulation Episode Summary     INR check location:      Preferred lab:      Send INR reminders to:  [facility]    Comments:        Anticoagulation Care Providers     Provider Role Specialty Phone number    Chuck Conner MD Referring Cardiology [phone]          Clinic Interview:  Patient Findings     Negatives:  Signs/symptoms of thrombosis, Signs/symptoms of bleeding,   Laboratory test error suspected, Change in health, Change in alcohol use,   Change in activity, Upcoming invasive procedure, Emergency department   visit, Upcoming dental procedure, Missed doses, Extra doses, Change in   medications, Change in diet/appetite, Hospital admission, Bruising, Other   complaints      Clinical Outcomes     Negatives:  Major bleeding event, Thromboembolic event,   Anticoagulation-related hospital admission, Anticoagulation-related ED   visit, Anticoagulation-related fatality        INR History:  Anticoagulation Monitoring 2021   INR 2.4 2.2 2.4   INR Date 2021   INR Goal 2.0-3.0 2.0-3.0 2.0-3.0   Trend Same Same Same   Last Week Total 37.5 mg 37.5 mg 37.5 mg   Next Week Total 37.5 mg 37.5 mg 37.5 mg   Sun 5 mg 5 mg 5 mg   Mon 5 mg 5 mg 5 mg   Tue 5 mg 5 mg 5 mg   Wed 5 mg 5 mg 5 mg   Thu 5 mg 5 mg 5 mg   Fri 7.5 mg 7.5 mg 7.5 mg   Sat 5 mg 5 mg 5 mg   Visit Report - - -   Some recent data might be hidden        Plan:  1. INR is therapeutic today- see above in Anticoagulation Summary.   Will instruct Diego Martinez to continue their warfarin regimen- see above in Anticoagulation Summary.  2. Follow up in 4 weeks.  3. Patient declines warfarin refills.  4. Verbal and written information provided. Patient expresses understanding and has no further questions at this time.    Lilly Burger

## 2021-10-11 ENCOUNTER — ANTICOAGULATION VISIT (OUTPATIENT)
Dept: PHARMACY | Facility: HOSPITAL | Age: 76
End: 2021-10-11

## 2021-10-11 DIAGNOSIS — I48.20 CHRONIC ATRIAL FIBRILLATION (HCC): Primary | ICD-10-CM

## 2021-10-11 LAB
INR PPP: 2.1 (ref 0.91–1.09)
PROTHROMBIN TIME: 25.8 SECONDS (ref 10–13.8)

## 2021-10-11 PROCEDURE — 85610 PROTHROMBIN TIME: CPT

## 2021-10-11 PROCEDURE — 36416 COLLJ CAPILLARY BLOOD SPEC: CPT

## 2021-10-11 RX ORDER — WARFARIN SODIUM 5 MG/1
TABLET ORAL
Qty: 100 TABLET | Refills: 1 | Status: SHIPPED | OUTPATIENT
Start: 2021-10-11 | End: 2021-12-29 | Stop reason: SDUPTHER

## 2021-10-11 NOTE — PROGRESS NOTES
Anticoagulation Clinic Progress Note    Anticoagulation Summary  As of 10/11/2021    INR goal:  2.0-3.0   TTR:  80.7 % (3.1 y)   INR used for dosin.1 (10/11/2021)   Warfarin maintenance plan:  7.5 mg every Fri; 5 mg all other days   Weekly warfarin total:  37.5 mg   No change documented:  Vicente Escobar, Pharmacy Intern   Plan last modified:  Gisele Colbert, Pharmacy Intern (2020)   Next INR check:  2021   Priority:  Maintenance   Target end date:  Indefinite    Indications    Chronic atrial fibrillation (HCC) [I48.20]             Anticoagulation Episode Summary     INR check location:      Preferred lab:      Send INR reminders to:  [facility]    Comments:        Anticoagulation Care Providers     Provider Role Specialty Phone number    Chuck Conner MD Referring Cardiology [phone]          Clinic Interview:  Patient Findings     Negatives:  Signs/symptoms of thrombosis, Signs/symptoms of bleeding,   Laboratory test error suspected, Change in health, Change in alcohol use,   Change in activity, Upcoming invasive procedure, Emergency department   visit, Upcoming dental procedure, Missed doses, Extra doses, Change in   medications, Change in diet/appetite, Hospital admission, Bruising, Other   complaints      Clinical Outcomes     Negatives:  Major bleeding event, Thromboembolic event,   Anticoagulation-related hospital admission, Anticoagulation-related ED   visit, Anticoagulation-related fatality        INR History:  Anticoagulation Monitoring 2021 2021 10/11/2021   INR 2.2 2.4 2.1   INR Date 2021 2021 10/11/2021   INR Goal 2.0-3.0 2.0-3.0 2.0-3.0   Trend Same Same Same   Last Week Total 37.5 mg 37.5 mg 37.5 mg   Next Week Total 37.5 mg 37.5 mg 37.5 mg   Sun 5 mg 5 mg 5 mg   Mon 5 mg 5 mg 5 mg   Tue 5 mg 5 mg 5 mg   Wed 5 mg 5 mg 5 mg   Thu 5 mg 5 mg 5 mg   Fri 7.5 mg 7.5 mg 7.5 mg   Sat 5 mg 5 mg 5 mg   Visit Report - - -   Some recent data might be hidden        Plan:  1. INR is Therapeutic today- see above in Anticoagulation Summary.  Will instruct Diego Martinez to Continue their warfarin regimen- see above in Anticoagulation Summary.  2. Follow up in 1 month  3. Patient declines warfarin refills.  4. Verbal and written information provided. Patient expresses understanding and has no further questions at this time.    Vicente Escobar, Pharmacy Intern

## 2021-11-16 ENCOUNTER — ANTICOAGULATION VISIT (OUTPATIENT)
Dept: PHARMACY | Facility: HOSPITAL | Age: 76
End: 2021-11-16

## 2021-11-16 DIAGNOSIS — I48.20 CHRONIC ATRIAL FIBRILLATION (HCC): Primary | ICD-10-CM

## 2021-11-16 LAB
INR PPP: 2 (ref 0.91–1.09)
PROTHROMBIN TIME: 24.3 SECONDS (ref 10–13.8)

## 2021-11-16 PROCEDURE — 36416 COLLJ CAPILLARY BLOOD SPEC: CPT

## 2021-11-16 PROCEDURE — 85610 PROTHROMBIN TIME: CPT

## 2021-11-16 NOTE — PROGRESS NOTES
Anticoagulation Clinic Progress Note    Anticoagulation Summary  As of 2021    INR goal:  2.0-3.0   TTR:  81.3 % (3.2 y)   INR used for dosin.0 (2021)   Warfarin maintenance plan:  7.5 mg every Fri; 5 mg all other days   Weekly warfarin total:  37.5 mg   No change documented:  Lilly Burger   Plan last modified:  Gisele Colbert, Pharmacy Intern (2020)   Next INR check:  2021   Priority:  Maintenance   Target end date:  Indefinite    Indications    Chronic atrial fibrillation (HCC) [I48.20]             Anticoagulation Episode Summary     INR check location:      Preferred lab:      Send INR reminders to:  [facility]    Comments:        Anticoagulation Care Providers     Provider Role Specialty Phone number    Chuck Conner MD Referring Cardiology [phone]          Clinic Interview:  Patient Findings     Negatives:  Signs/symptoms of thrombosis, Signs/symptoms of bleeding,   Laboratory test error suspected, Change in health, Change in alcohol use,   Change in activity, Upcoming invasive procedure, Emergency department   visit, Upcoming dental procedure, Missed doses, Extra doses, Change in   medications, Change in diet/appetite, Hospital admission, Bruising, Other   complaints      Clinical Outcomes     Negatives:  Major bleeding event, Thromboembolic event,   Anticoagulation-related hospital admission, Anticoagulation-related ED   visit, Anticoagulation-related fatality        INR History:  Anticoagulation Monitoring 2021 10/11/2021 2021   INR 2.4 2.1 2.0   INR Date 2021 10/11/2021 2021   INR Goal 2.0-3.0 2.0-3.0 2.0-3.0   Trend Same Same Same   Last Week Total 37.5 mg 37.5 mg 37.5 mg   Next Week Total 37.5 mg 37.5 mg 37.5 mg   Sun 5 mg 5 mg 5 mg   Mon 5 mg 5 mg 5 mg   Tue 5 mg 5 mg 5 mg   Wed 5 mg 5 mg 5 mg   Thu 5 mg 5 mg 5 mg   Fri 7.5 mg 7.5 mg 7.5 mg   Sat 5 mg 5 mg 5 mg   Visit Report - - -   Some recent data might be hidden        Plan:  1. INR is therapeutic today- see above in Anticoagulation Summary.   Will instruct Diego Martinez to continue their warfarin regimen- see above in Anticoagulation Summary.  2. Follow up in 6 weeks.  3. Patient declines warfarin refills.  4. Verbal and written information provided. Patient expresses understanding and has no further questions at this time.    Lilly Burger

## 2021-11-29 RX ORDER — AMLODIPINE BESYLATE 5 MG/1
TABLET ORAL
Qty: 90 TABLET | Refills: 0 | Status: SHIPPED | OUTPATIENT
Start: 2021-11-29 | End: 2022-03-01

## 2021-12-29 ENCOUNTER — ANTICOAGULATION VISIT (OUTPATIENT)
Dept: PHARMACY | Facility: HOSPITAL | Age: 76
End: 2021-12-29

## 2021-12-29 DIAGNOSIS — I48.20 CHRONIC ATRIAL FIBRILLATION (HCC): Primary | ICD-10-CM

## 2021-12-29 LAB
INR PPP: 2.1 (ref 0.91–1.09)
PROTHROMBIN TIME: 25.7 SECONDS (ref 10–13.8)

## 2021-12-29 PROCEDURE — 85610 PROTHROMBIN TIME: CPT

## 2021-12-29 PROCEDURE — 36416 COLLJ CAPILLARY BLOOD SPEC: CPT

## 2021-12-29 RX ORDER — WARFARIN SODIUM 5 MG/1
TABLET ORAL
Qty: 100 TABLET | Refills: 1 | Status: SHIPPED | OUTPATIENT
Start: 2021-12-29 | End: 2022-03-31 | Stop reason: SDUPTHER

## 2021-12-29 NOTE — PROGRESS NOTES
Anticoagulation Clinic Progress Note    Anticoagulation Summary  As of 2021    INR goal:  2.0-3.0   TTR:  81.9 % (3.4 y)   INR used for dosin.1 (2021)   Warfarin maintenance plan:  7.5 mg every Fri; 5 mg all other days   Weekly warfarin total:  37.5 mg   No change documented:  Amrit Lang, PharmD   Plan last modified:  Gisele Colbert, Pharmacy Intern (2020)   Next INR check:  2022   Priority:  Maintenance   Target end date:  Indefinite    Indications    Chronic atrial fibrillation (HCC) [I48.20]             Anticoagulation Episode Summary     INR check location:      Preferred lab:      Send INR reminders to:   [facility]    Comments:        Anticoagulation Care Providers     Provider Role Specialty Phone number    Chuck Conner MD Referring Cardiology [phone]          Clinic Interview:  Patient Findings     Negatives:  Signs/symptoms of thrombosis, Signs/symptoms of bleeding,   Laboratory test error suspected, Change in health, Change in alcohol use,   Change in activity, Upcoming invasive procedure, Emergency department   visit, Upcoming dental procedure, Missed doses, Extra doses, Change in   medications, Change in diet/appetite, Hospital admission, Bruising, Other   complaints      Clinical Outcomes     Negatives:  Major bleeding event, Thromboembolic event,   Anticoagulation-related hospital admission, Anticoagulation-related ED   visit, Anticoagulation-related fatality        INR History:  Anticoagulation Monitoring 10/11/2021 2021 2021   INR 2.1 2.0 2.1   INR Date 10/11/2021 2021 2021   INR Goal 2.0-3.0 2.0-3.0 2.0-3.0   Trend Same Same Same   Last Week Total 37.5 mg 37.5 mg 37.5 mg   Next Week Total 37.5 mg 37.5 mg 37.5 mg   Sun 5 mg 5 mg 5 mg   Mon 5 mg 5 mg 5 mg   Tue 5 mg 5 mg 5 mg   Wed 5 mg 5 mg 5 mg   Thu 5 mg 5 mg 5 mg   Fri 7.5 mg 7.5 mg 7.5 mg   Sat 5 mg 5 mg 5 mg   Visit Report - - -   Some recent data might be hidden        Plan:  1. INR is Therapeutic today- see above in Anticoagulation Summary.  Will instruct Diego Martinez to Continue their warfarin regimen- see above in Anticoagulation Summary.  2. Follow up in 6 weeks  3. Patient desires warfarin refills.  4. Verbal and written information provided. Patient expresses understanding and has no further questions at this time.    Amrit Lang, PharmD

## 2022-02-09 ENCOUNTER — ANTICOAGULATION VISIT (OUTPATIENT)
Dept: PHARMACY | Facility: HOSPITAL | Age: 77
End: 2022-02-09

## 2022-02-09 DIAGNOSIS — I48.20 CHRONIC ATRIAL FIBRILLATION: Primary | ICD-10-CM

## 2022-02-09 LAB
INR PPP: 2.4 (ref 0.91–1.09)
PROTHROMBIN TIME: 28.3 SECONDS (ref 10–13.8)

## 2022-02-09 PROCEDURE — 85610 PROTHROMBIN TIME: CPT

## 2022-02-09 PROCEDURE — 36416 COLLJ CAPILLARY BLOOD SPEC: CPT

## 2022-02-09 NOTE — PROGRESS NOTES
Anticoagulation Clinic Progress Note    Anticoagulation Summary  As of 2022    INR goal:  2.0-3.0   TTR:  82.5 % (3.5 y)   INR used for dosin.4 (2022)   Warfarin maintenance plan:  7.5 mg every Fri; 5 mg all other days   Weekly warfarin total:  37.5 mg   No change documented:  Paloma Erwin RPH   Plan last modified:  Gisele Colbert, Pharmacy Intern (2020)   Next INR check:  3/23/2022   Priority:  Maintenance   Target end date:  Indefinite    Indications    Chronic atrial fibrillation (HCC) [I48.20]             Anticoagulation Episode Summary     INR check location:      Preferred lab:      Send INR reminders to:   [facility]    Comments:        Anticoagulation Care Providers     Provider Role Specialty Phone number    Chuck Conner MD Referring Cardiology [phone]          Clinic Interview:  Patient Findings     Negatives:  Signs/symptoms of thrombosis, Signs/symptoms of bleeding,   Laboratory test error suspected, Change in health, Change in alcohol use,   Change in activity, Upcoming invasive procedure, Emergency department   visit, Upcoming dental procedure, Missed doses, Extra doses, Change in   medications, Change in diet/appetite, Hospital admission, Bruising, Other   complaints      Clinical Outcomes     Negatives:  Major bleeding event, Thromboembolic event,   Anticoagulation-related hospital admission, Anticoagulation-related ED   visit, Anticoagulation-related fatality        INR History:  Anticoagulation Monitoring 2021   INR 2.0 2.1 2.4   INR Date 2021   INR Goal 2.0-3.0 2.0-3.0 2.0-3.0   Trend Same Same Same   Last Week Total 37.5 mg 37.5 mg 37.5 mg   Next Week Total 37.5 mg 37.5 mg 37.5 mg   Sun 5 mg 5 mg 5 mg   Mon 5 mg 5 mg 5 mg   Tue 5 mg 5 mg 5 mg   Wed 5 mg 5 mg 5 mg   Thu 5 mg 5 mg 5 mg   Fri 7.5 mg 7.5 mg 7.5 mg   Sat 5 mg 5 mg 5 mg   Visit Report - - -   Some recent data might be hidden        Plan:  1. INR is Therapeutic today- see above in Anticoagulation Summary.  Will instruct Diego Martinez to Continue their warfarin regimen- see above in Anticoagulation Summary.  2. Follow up in 6 weeks  3. Patient declines warfarin refills.  4. Verbal and written information provided. Patient expresses understanding and has no further questions at this time.    Paloma Erwin, [facility]

## 2022-03-01 RX ORDER — AMLODIPINE BESYLATE 5 MG/1
TABLET ORAL
Qty: 90 TABLET | Refills: 1 | Status: SHIPPED | OUTPATIENT
Start: 2022-03-01 | End: 2022-08-03

## 2022-03-31 ENCOUNTER — ANTICOAGULATION VISIT (OUTPATIENT)
Dept: PHARMACY | Facility: HOSPITAL | Age: 77
End: 2022-03-31

## 2022-03-31 DIAGNOSIS — I48.20 CHRONIC ATRIAL FIBRILLATION: Primary | ICD-10-CM

## 2022-03-31 LAB
INR PPP: 2.2 (ref 0.91–1.09)
PROTHROMBIN TIME: 25.9 SECONDS (ref 10–13.8)

## 2022-03-31 PROCEDURE — 85610 PROTHROMBIN TIME: CPT

## 2022-03-31 PROCEDURE — 36416 COLLJ CAPILLARY BLOOD SPEC: CPT

## 2022-03-31 RX ORDER — WARFARIN SODIUM 5 MG/1
TABLET ORAL
Qty: 100 TABLET | Refills: 1 | Status: SHIPPED | OUTPATIENT
Start: 2022-03-31

## 2022-03-31 NOTE — PROGRESS NOTES
Anticoagulation Clinic Progress Note    Anticoagulation Summary  As of 3/31/2022    INR goal:  2.0-3.0   TTR:  83.2 % (3.6 y)   INR used for dosin.2 (3/31/2022)   Warfarin maintenance plan:  7.5 mg every Fri; 5 mg all other days   Weekly warfarin total:  37.5 mg   No change documented:  Amrit Lang, PharmD   Plan last modified:  Gisele Colbert, Pharmacy Intern (2020)   Next INR check:  2022   Priority:  Maintenance   Target end date:  Indefinite    Indications    Chronic atrial fibrillation (HCC) [I48.20]             Anticoagulation Episode Summary     INR check location:      Preferred lab:      Send INR reminders to:   [facility]    Comments:        Anticoagulation Care Providers     Provider Role Specialty Phone number    Chuck Conenr MD Referring Cardiology [phone]          Clinic Interview:  Patient Findings     Negatives:  Signs/symptoms of thrombosis, Signs/symptoms of bleeding,   Laboratory test error suspected, Change in health, Change in alcohol use,   Change in activity, Upcoming invasive procedure, Emergency department   visit, Upcoming dental procedure, Missed doses, Extra doses, Change in   medications, Change in diet/appetite, Hospital admission, Bruising, Other   complaints      Clinical Outcomes     Negatives:  Major bleeding event, Thromboembolic event,   Anticoagulation-related hospital admission, Anticoagulation-related ED   visit, Anticoagulation-related fatality        INR History:  Anticoagulation Monitoring 2021 2022 3/31/2022   INR 2.1 2.4 2.2   INR Date 2021 2022 3/31/2022   INR Goal 2.0-3.0 2.0-3.0 2.0-3.0   Trend Same Same Same   Last Week Total 37.5 mg 37.5 mg 37.5 mg   Next Week Total 37.5 mg 37.5 mg 37.5 mg   Sun 5 mg 5 mg 5 mg   Mon 5 mg 5 mg 5 mg   Tue 5 mg 5 mg 5 mg   Wed 5 mg 5 mg 5 mg   Thu 5 mg 5 mg 5 mg   Fri 7.5 mg 7.5 mg 7.5 mg   Sat 5 mg 5 mg 5 mg   Visit Report - - -   Some recent data might be hidden        Plan:  1. INR is Therapeutic today- see above in Anticoagulation Summary.  Will instruct Diego Martinez to Continue their warfarin regimen- see above in Anticoagulation Summary.  2. Follow up in 6 weeks  3. Patient desires warfarin refills.  4. Verbal and written information provided. Patient expresses understanding and has no further questions at this time.    Amrit Lang, PharmD

## 2022-05-05 RX ORDER — METOPROLOL SUCCINATE 25 MG/1
TABLET, EXTENDED RELEASE ORAL
Qty: 90 TABLET | Refills: 0 | Status: SHIPPED | OUTPATIENT
Start: 2022-05-05 | End: 2022-08-03

## 2022-05-12 ENCOUNTER — ANTICOAGULATION VISIT (OUTPATIENT)
Dept: PHARMACY | Facility: HOSPITAL | Age: 77
End: 2022-05-12

## 2022-05-12 DIAGNOSIS — I48.20 CHRONIC ATRIAL FIBRILLATION: Primary | ICD-10-CM

## 2022-05-12 LAB
INR PPP: 1.7 (ref 0.91–1.09)
PROTHROMBIN TIME: 20 SECONDS (ref 10–13.8)

## 2022-05-12 PROCEDURE — G0463 HOSPITAL OUTPT CLINIC VISIT: HCPCS

## 2022-05-12 PROCEDURE — 85610 PROTHROMBIN TIME: CPT

## 2022-05-12 PROCEDURE — 36416 COLLJ CAPILLARY BLOOD SPEC: CPT

## 2022-05-12 NOTE — PROGRESS NOTES
Anticoagulation Clinic Progress Note    Anticoagulation Summary  As of 2022    INR goal:  2.0-3.0   TTR:  81.9 % (3.7 y)   INR used for dosin.7 (2022)   Warfarin maintenance plan:  7.5 mg every Fri; 5 mg all other days   Weekly warfarin total:  37.5 mg   Plan last modified:  Gisele Colbert, Pharmacy Intern (2020)   Next INR check:  2022   Priority:  Maintenance   Target end date:  Indefinite    Indications    Chronic atrial fibrillation (HCC) [I48.20]             Anticoagulation Episode Summary     INR check location:      Preferred lab:      Send INR reminders to:   [facility]    Comments:        Anticoagulation Care Providers     Provider Role Specialty Phone number    Chuck Conner MD Referring Cardiology [phone]          Clinic Interview:  Patient Findings     Negatives:  Signs/symptoms of thrombosis, Signs/symptoms of bleeding,   Laboratory test error suspected, Change in health, Change in alcohol use,   Change in activity, Upcoming invasive procedure, Emergency department   visit, Upcoming dental procedure, Missed doses, Extra doses, Change in   medications, Change in diet/appetite, Hospital admission, Bruising, Other   complaints      Clinical Outcomes     Negatives:  Major bleeding event, Thromboembolic event,   Anticoagulation-related hospital admission, Anticoagulation-related ED   visit, Anticoagulation-related fatality        INR History:  Anticoagulation Monitoring 2022 3/31/2022 2022   INR 2.4 2.2 1.7   INR Date 2022 3/31/2022 2022   INR Goal 2.0-3.0 2.0-3.0 2.0-3.0   Trend Same Same Same   Last Week Total 37.5 mg 37.5 mg 37.5 mg   Next Week Total 37.5 mg 37.5 mg 40 mg   Sun 5 mg 5 mg 5 mg   Mon 5 mg 5 mg 5 mg   Tue 5 mg 5 mg 5 mg   Wed 5 mg 5 mg 5 mg   Thu 5 mg 5 mg 7.5 mg (); Otherwise 5 mg   Fri 7.5 mg 7.5 mg 7.5 mg   Sat 5 mg 5 mg 5 mg   Visit Report - - -   Some recent data might be hidden       Plan:  1. INR is  Subtherapeutic today- see above in Anticoagulation Summary.  Will instruct Diego Martinez to Change their warfarin regimen- see above in Anticoagulation Summary.  2. Follow up in 1 week  3. Patient declines warfarin refills.  4. Verbal and written information provided. Patient expresses understanding and has no further questions at this time.    Mallory Ch, PharmD

## 2022-05-31 ENCOUNTER — ANTICOAGULATION VISIT (OUTPATIENT)
Dept: PHARMACY | Facility: HOSPITAL | Age: 77
End: 2022-05-31

## 2022-05-31 DIAGNOSIS — I48.20 CHRONIC ATRIAL FIBRILLATION: Primary | ICD-10-CM

## 2022-05-31 LAB
INR PPP: 2.4 (ref 0.91–1.09)
PROTHROMBIN TIME: 28.9 SECONDS (ref 10–13.8)

## 2022-05-31 PROCEDURE — 85610 PROTHROMBIN TIME: CPT

## 2022-05-31 PROCEDURE — 36416 COLLJ CAPILLARY BLOOD SPEC: CPT

## 2022-06-30 ENCOUNTER — ANTICOAGULATION VISIT (OUTPATIENT)
Dept: PHARMACY | Facility: HOSPITAL | Age: 77
End: 2022-06-30

## 2022-06-30 DIAGNOSIS — I48.20 CHRONIC ATRIAL FIBRILLATION: Primary | ICD-10-CM

## 2022-06-30 LAB
INR PPP: 2 (ref 0.91–1.09)
PROTHROMBIN TIME: 23.5 SECONDS (ref 10–13.8)

## 2022-06-30 PROCEDURE — 85610 PROTHROMBIN TIME: CPT

## 2022-06-30 PROCEDURE — 36416 COLLJ CAPILLARY BLOOD SPEC: CPT

## 2022-06-30 NOTE — PROGRESS NOTES
Anticoagulation Clinic Progress Note    Anticoagulation Summary  As of 2022    INR goal:  2.0-3.0   TTR:  81.9 % (3.9 y)   INR used for dosin.0 (2022)   Warfarin maintenance plan:  7.5 mg every Fri; 5 mg all other days   Weekly warfarin total:  37.5 mg   No change documented:  Maye Mancia [facility]   Plan last modified:  Gisele Colbert, Pharmacy Intern (2020)   Next INR check:  2022   Priority:  Maintenance   Target end date:  Indefinite    Indications    Chronic atrial fibrillation (HCC) [I48.20]             Anticoagulation Episode Summary     INR check location:      Preferred lab:      Send INR reminders to:   [facility]    Comments:        Anticoagulation Care Providers     Provider Role Specialty Phone number    Chuck Conner MD Referring Cardiology [phone]          Clinic Interview:      INR History:  Anticoagulation Monitoring 2022   INR 1.7 2.4 2.0   INR Date 2022   INR Goal 2.0-3.0 2.0-3.0 2.0-3.0   Trend Same Same Same   Last Week Total 37.5 mg 37.5 mg 37.5 mg   Next Week Total 40 mg 37.5 mg 37.5 mg   Sun 5 mg 5 mg 5 mg   Mon 5 mg 5 mg 5 mg   Tue 5 mg 5 mg 5 mg   Wed 5 mg 5 mg 5 mg   Thu 7.5 mg (); Otherwise 5 mg 5 mg 5 mg   Fri 7.5 mg 7.5 mg 7.5 mg   Sat 5 mg 5 mg 5 mg   Visit Report - - -   Some recent data might be hidden       Plan:  1. INR is Therapeutic today- see above in Anticoagulation Summary.  Will instruct Diego Martinez to Continue their warfarin regimen- see above in Anticoagulation Summary.  2. Follow up in 6 weeks  3. Patient declines warfarin refills.  4. Verbal and written information provided. Patient expresses understanding and has no further questions at this time.    Maye Mancia [facility]

## 2022-07-22 ENCOUNTER — OFFICE VISIT (OUTPATIENT)
Dept: CARDIOLOGY | Facility: CLINIC | Age: 77
End: 2022-07-22

## 2022-07-22 VITALS
SYSTOLIC BLOOD PRESSURE: 144 MMHG | BODY MASS INDEX: 29.78 KG/M2 | DIASTOLIC BLOOD PRESSURE: 80 MMHG | WEIGHT: 208 LBS | HEART RATE: 43 BPM | HEIGHT: 70 IN

## 2022-07-22 DIAGNOSIS — I48.20 CHRONIC ATRIAL FIBRILLATION: Primary | ICD-10-CM

## 2022-07-22 DIAGNOSIS — E66.09 NON MORBID OBESITY DUE TO EXCESS CALORIES: ICD-10-CM

## 2022-07-22 DIAGNOSIS — I10 ESSENTIAL HYPERTENSION: ICD-10-CM

## 2022-07-22 PROCEDURE — 93000 ELECTROCARDIOGRAM COMPLETE: CPT | Performed by: INTERNAL MEDICINE

## 2022-07-22 PROCEDURE — 99214 OFFICE O/P EST MOD 30 MIN: CPT | Performed by: INTERNAL MEDICINE

## 2022-07-22 NOTE — PROGRESS NOTES
Date of Office Visit: 2022  Encounter Provider: Chuck Conner MD  Place of Service: [facility] CARDIOLOGY  Patient Name: Diego Martinez  : 1945    Subjective:     Encounter Date:2022      Patient ID: Diego Martinez is a 76 y.o. male who has a cc of  Perm AF and HTN --     The patient had a good year.   No anginal chest pain,   No sig buckley,   No soa,   No fainting,  No orthostasis.   No edema.   Exercise tolerance: good.     He has kristen but no complaints.     There have been no hospital admission since the last visit.     There have been no bleeding events.       Past Medical History:   Diagnosis Date   • Atrial fibrillation (HCC)    • Chronic atrial fibrillation (HCC)    • Colon cancer (HCC)    • Essential hypertension    • Non morbid obesity due to excess calories    • PITO on CPAP        Social History     Socioeconomic History   • Marital status: Single   Tobacco Use   • Smoking status: Former Smoker   Vaping Use   • Vaping Use: Never used   Substance and Sexual Activity   • Alcohol use: No   • Drug use: No       History reviewed. No pertinent family history.    Review of Systems   Constitutional: Negative for fever and night sweats.   HENT: Negative for ear pain and stridor.    Eyes: Negative for discharge and visual halos.   Cardiovascular: Negative for cyanosis.   Respiratory: Negative for hemoptysis and sputum production.    Hematologic/Lymphatic: Negative for adenopathy.   Skin: Negative for nail changes and unusual hair distribution.   Musculoskeletal: Negative for gout and joint swelling.   Gastrointestinal: Negative for bowel incontinence and flatus.   Genitourinary: Negative for dysuria and flank pain.   Neurological: Negative for seizures and tremors.   Psychiatric/Behavioral: Negative for altered mental status. The patient is not nervous/anxious.             Objective:     Vitals:    22 1034   BP: 144/80   Pulse: (!) 43   Weight: 94.3 kg  "(208 lb)   Height: 177.8 cm (70\")         Eyes:      General:         Right eye: No discharge.         Left eye: No discharge.   HENT:      Head: Normocephalic and atraumatic.   Neck:      Thyroid: No thyromegaly.      Vascular: No JVD.   Pulmonary:      Effort: Pulmonary effort is normal.      Breath sounds: Normal breath sounds. No rales.   Cardiovascular:      Normal rate. Irregularly irregular rhythm.      No gallop.   Edema:     Peripheral edema absent.   Abdominal:      General: Bowel sounds are normal.      Palpations: Abdomen is soft.      Tenderness: There is no abdominal tenderness.   Musculoskeletal: Normal range of motion.         General: No deformity. Skin:     General: Skin is warm and dry.      Findings: No erythema.   Neurological:      Mental Status: Alert and oriented to person, place, and time.      Motor: Normal muscle tone.   Psychiatric:         Behavior: Behavior normal.         Thought Content: Thought content normal.           ECG 12 Lead    Date/Time: 7/22/2022 10:50 AM  Performed by: Chuck Conner MD  Authorized by: Chuck Conner MD   Comparison: compared with previous ECG   Similar to previous ECG  Rhythm: atrial fibrillation            Lab Review:       Assessment:          Diagnosis Plan   1. Chronic atrial fibrillation (HCC)     2. Essential hypertension     3. Non morbid obesity due to excess calories            Plan:     He is fine --     Exam and exercise tolerance good.     BP is ok     He wants to switch to r-ban I will prescribe it.           "

## 2022-08-03 RX ORDER — METOPROLOL SUCCINATE 25 MG/1
TABLET, EXTENDED RELEASE ORAL
Qty: 90 TABLET | Refills: 0 | Status: SHIPPED | OUTPATIENT
Start: 2022-08-03 | End: 2022-11-01

## 2022-08-03 RX ORDER — AMLODIPINE BESYLATE 5 MG/1
TABLET ORAL
Qty: 90 TABLET | Refills: 0 | Status: SHIPPED | OUTPATIENT
Start: 2022-08-03 | End: 2022-11-01

## 2022-08-22 ENCOUNTER — ANTICOAGULATION VISIT (OUTPATIENT)
Dept: PHARMACY | Facility: HOSPITAL | Age: 77
End: 2022-08-22

## 2022-08-22 ENCOUNTER — TELEPHONE (OUTPATIENT)
Dept: PHARMACY | Facility: HOSPITAL | Age: 77
End: 2022-08-22

## 2022-08-22 DIAGNOSIS — I48.20 CHRONIC ATRIAL FIBRILLATION: Primary | ICD-10-CM

## 2022-08-22 NOTE — PROGRESS NOTES
This visit is for documentation purposes only: Patient is now taking rivaroxaban.  No longer following in the [facility]. It has been a pleasure being part of his care.

## 2022-08-22 NOTE — TELEPHONE ENCOUNTER
Called patient regarding missed appointment and he states he has disconintued warfarin and started rivaroxaban.

## 2022-11-01 RX ORDER — AMLODIPINE BESYLATE 5 MG/1
TABLET ORAL
Qty: 90 TABLET | Refills: 3 | Status: SHIPPED | OUTPATIENT
Start: 2022-11-01

## 2022-11-01 RX ORDER — METOPROLOL SUCCINATE 25 MG/1
TABLET, EXTENDED RELEASE ORAL
Qty: 90 TABLET | Refills: 3 | Status: SHIPPED | OUTPATIENT
Start: 2022-11-01

## 2023-07-25 ENCOUNTER — OFFICE VISIT (OUTPATIENT)
Dept: CARDIOLOGY | Facility: CLINIC | Age: 78
End: 2023-07-25
Payer: MEDICARE

## 2023-07-25 VITALS
DIASTOLIC BLOOD PRESSURE: 76 MMHG | HEIGHT: 70 IN | WEIGHT: 204 LBS | BODY MASS INDEX: 29.2 KG/M2 | HEART RATE: 40 BPM | SYSTOLIC BLOOD PRESSURE: 138 MMHG

## 2023-07-25 DIAGNOSIS — I48.20 CHRONIC ATRIAL FIBRILLATION: Primary | ICD-10-CM

## 2023-07-25 DIAGNOSIS — I10 ESSENTIAL HYPERTENSION: ICD-10-CM

## 2023-07-25 PROCEDURE — 99213 OFFICE O/P EST LOW 20 MIN: CPT | Performed by: NURSE PRACTITIONER

## 2023-07-25 PROCEDURE — 93000 ELECTROCARDIOGRAM COMPLETE: CPT | Performed by: NURSE PRACTITIONER

## 2023-07-25 PROCEDURE — 1160F RVW MEDS BY RX/DR IN RCRD: CPT | Performed by: NURSE PRACTITIONER

## 2023-07-25 PROCEDURE — 3075F SYST BP GE 130 - 139MM HG: CPT | Performed by: NURSE PRACTITIONER

## 2023-07-25 PROCEDURE — 1159F MED LIST DOCD IN RCRD: CPT | Performed by: NURSE PRACTITIONER

## 2023-07-25 PROCEDURE — 3078F DIAST BP <80 MM HG: CPT | Performed by: NURSE PRACTITIONER

## 2023-07-25 NOTE — PROGRESS NOTES
Date of Office Visit: 2023  Encounter Provider: TJ Carranza  Place of Service: [facility] CARDIOLOGY  Patient Name: Diego Martinez  :1945    Chief Complaint   Patient presents with    chronic AFIB   :     HPI: Diego Martinez is a 77 y.o. male who follows with Dr. Conner---perm AF and HTN.     Presents for routine follow up    Doing well. No chest pain, dyspnea, PND, orthopnea or edema.     Rivaroxaban for AC--no issues.      Past Medical History:   Diagnosis Date    Atrial fibrillation     Chronic atrial fibrillation     Colon cancer     Essential hypertension     Non morbid obesity due to excess calories     PITO on CPAP        No past surgical history on file.    Social History     Socioeconomic History    Marital status:    Tobacco Use    Smoking status: Former   Vaping Use    Vaping Use: Never used   Substance and Sexual Activity    Alcohol use: No    Drug use: No       No family history on file.    Review of Systems   Constitutional: Negative for chills, fever and malaise/fatigue.   Cardiovascular:  Negative for chest pain, dyspnea on exertion, leg swelling, near-syncope, orthopnea, palpitations, paroxysmal nocturnal dyspnea and syncope.   Respiratory:  Negative for cough and shortness of breath.    Hematologic/Lymphatic: Negative.    Musculoskeletal:  Negative for joint pain, joint swelling and myalgias.   Gastrointestinal:  Negative for abdominal pain, diarrhea, melena, nausea and vomiting.   Genitourinary:  Negative for frequency and hematuria.   Neurological:  Negative for light-headedness, numbness, paresthesias and seizures.   Allergic/Immunologic: Negative.    All other systems reviewed and are negative.    No Known Allergies      Current Outpatient Medications:     amLODIPine (NORVASC) 5 MG tablet, TAKE 1 TABLET BY MOUTH EVERY DAY, Disp: 90 tablet, Rfl: 3    metoprolol succinate XL (TOPROL-XL) 25 MG 24 hr tablet, TAKE 1 TABLET  "BY MOUTH EVERY DAY, Disp: 90 tablet, Rfl: 3    multivitamin with minerals tablet tablet, Take 1 tablet by mouth Daily., Disp: , Rfl:     omeprazole (priLOSEC) 20 MG capsule, Take 1 capsule by mouth Daily., Disp: , Rfl:     Xarelto 20 MG tablet, TAKE 1 TABLET BY MOUTH EVERY DAY with dinner, Disp: 90 tablet, Rfl: 0      Objective:     Vitals:    07/25/23 1024   BP: 138/76   Pulse: (!) 40   Weight: 92.5 kg (204 lb)   Height: 177.8 cm (70\")     Body mass index is 29.27 kg/m².    PHYSICAL EXAM:    Vitals Reviewed.   General Appearance: No acute distress, well developed and well nourished.   Eyes: Conjunctiva and lids: No erythema, swelling, or discharge. Sclera non-icteric.   HENT: Atraumatic, normocephalic. External eyes, ears, and nose normal.   Respiratory: No signs of respiratory distress. Respiration rhythm and depth normal.   Clear to auscultation. No rales, crackles, rhonchi, or wheezing auscultated.   Cardiovascular:  Heart Rate and Rhythm: Irregularly, irregular. Heart Sounds: S1 and S2.   Murmurs: No murmurs noted. No rubs, thrills, or gallops.   Lower Extremities: No edema noted.  Gastrointestinal:  Abdomen soft, non-distended, non-tender.   Musculoskeletal: Normal movement of extremities  Skin: Warm and dry.   Psychiatric: Patient alert and oriented to person, place, and time. Speech and behavior appropriate. Normal mood and affect.       ECG 12 Lead    Date/Time: 7/25/2023 10:28 AM  Performed by: Rosemarie Juarez APRN  Authorized by: Rosemarie Juarez APRN   Comparison: compared with previous ECG   Similar to previous ECG  Rhythm: atrial fibrillation  BPM: 40          Assessment:       Diagnosis Plan   1. Chronic atrial fibrillation        2. Essential hypertension               Plan:       Perm AF--doing well. Rivaroxaban for AC---low dose metoprolol--HR runs in 40's he feels fine, no dyspnea, dizziness or syncope.     2. HTN, controlled.     Follow up with Dr. Conner in 1 year.    As always, it has been a " pleasure to participate in your patient's care.      Sincerely,         TJ Anderson

## 2023-10-18 RX ORDER — RIVAROXABAN 20 MG/1
TABLET, FILM COATED ORAL
Qty: 90 TABLET | Refills: 0 | Status: SHIPPED | OUTPATIENT
Start: 2023-10-18

## 2023-10-19 RX ORDER — RIVAROXABAN 20 MG/1
TABLET, FILM COATED ORAL
Qty: 90 TABLET | Refills: 0 | OUTPATIENT
Start: 2023-10-19

## 2023-10-20 RX ORDER — RIVAROXABAN 20 MG/1
TABLET, FILM COATED ORAL
Qty: 90 TABLET | Refills: 0 | OUTPATIENT
Start: 2023-10-20

## 2023-11-13 RX ORDER — METOPROLOL SUCCINATE 25 MG/1
TABLET, EXTENDED RELEASE ORAL
Qty: 90 TABLET | Refills: 3 | Status: SHIPPED | OUTPATIENT
Start: 2023-11-13

## 2023-12-26 RX ORDER — AMLODIPINE BESYLATE 5 MG/1
TABLET ORAL
Qty: 90 TABLET | Refills: 0 | Status: SHIPPED | OUTPATIENT
Start: 2023-12-26

## 2024-01-16 RX ORDER — RIVAROXABAN 20 MG/1
TABLET, FILM COATED ORAL
Qty: 90 TABLET | Refills: 0 | OUTPATIENT
Start: 2024-01-16

## 2024-01-16 RX ORDER — RIVAROXABAN 20 MG/1
TABLET, FILM COATED ORAL
Qty: 90 TABLET | Refills: 0 | Status: SHIPPED | OUTPATIENT
Start: 2024-01-16

## 2024-04-05 RX ORDER — AMLODIPINE BESYLATE 5 MG/1
TABLET ORAL
Qty: 90 TABLET | Refills: 1 | Status: SHIPPED | OUTPATIENT
Start: 2024-04-05